# Patient Record
Sex: FEMALE | Race: WHITE | Employment: FULL TIME | URBAN - METROPOLITAN AREA
[De-identification: names, ages, dates, MRNs, and addresses within clinical notes are randomized per-mention and may not be internally consistent; named-entity substitution may affect disease eponyms.]

---

## 2021-03-29 ENCOUNTER — HOSPITAL ENCOUNTER (INPATIENT)
Age: 51
LOS: 4 days | Discharge: HOME | DRG: 897 | End: 2021-04-02
Attending: STUDENT IN AN ORGANIZED HEALTH CARE EDUCATION/TRAINING PROGRAM | Admitting: HOSPITALIST
Payer: COMMERCIAL

## 2021-03-29 ENCOUNTER — APPOINTMENT (OUTPATIENT)
Dept: CT IMAGING | Age: 51
DRG: 897 | End: 2021-03-29
Attending: STUDENT IN AN ORGANIZED HEALTH CARE EDUCATION/TRAINING PROGRAM
Payer: COMMERCIAL

## 2021-03-29 ENCOUNTER — APPOINTMENT (OUTPATIENT)
Dept: ULTRASOUND IMAGING | Age: 51
DRG: 897 | End: 2021-03-29
Attending: STUDENT IN AN ORGANIZED HEALTH CARE EDUCATION/TRAINING PROGRAM
Payer: COMMERCIAL

## 2021-03-29 DIAGNOSIS — R74.01 TRANSAMINITIS: Primary | ICD-10-CM

## 2021-03-29 DIAGNOSIS — R00.0 SINUS TACHYCARDIA: ICD-10-CM

## 2021-03-29 DIAGNOSIS — E87.20 LACTIC ACIDOSIS: ICD-10-CM

## 2021-03-29 DIAGNOSIS — R93.2 ABNORMAL ULTRASOUND OF LIVER: ICD-10-CM

## 2021-03-29 DIAGNOSIS — E87.6 HYPOKALEMIA: ICD-10-CM

## 2021-03-29 PROBLEM — E87.1 HYPONATREMIA: Status: ACTIVE | Noted: 2021-03-29

## 2021-03-29 PROBLEM — F10.10 ALCOHOL ABUSE: Status: ACTIVE | Noted: 2021-03-29

## 2021-03-29 PROBLEM — R53.1 GENERALIZED WEAKNESS: Status: ACTIVE | Noted: 2021-03-29

## 2021-03-29 PROBLEM — D69.6 THROMBOCYTOPENIA (HCC): Status: ACTIVE | Noted: 2021-03-29

## 2021-03-29 PROBLEM — K76.0 STEATOSIS OF LIVER: Status: ACTIVE | Noted: 2021-03-29

## 2021-03-29 LAB
ALBUMIN SERPL-MCNC: 3.5 G/DL (ref 3.5–5)
ALBUMIN/GLOB SERPL: 0.9 {RATIO} (ref 1.1–2.2)
ALP SERPL-CCNC: 124 U/L (ref 45–117)
ALT SERPL-CCNC: 186 U/L (ref 12–78)
ANION GAP SERPL CALC-SCNC: 11 MMOL/L (ref 5–15)
APPEARANCE UR: CLEAR
AST SERPL-CCNC: 476 U/L (ref 15–37)
BACTERIA URNS QL MICRO: NEGATIVE /HPF
BASOPHILS # BLD: 0 K/UL (ref 0–0.1)
BASOPHILS NFR BLD: 1 % (ref 0–1)
BILIRUB SERPL-MCNC: 0.5 MG/DL (ref 0.2–1)
BILIRUB UR QL: NEGATIVE
BNP SERPL-MCNC: 185 PG/ML
BUN SERPL-MCNC: 5 MG/DL (ref 6–20)
BUN/CREAT SERPL: 10 (ref 12–20)
CALCIUM SERPL-MCNC: 8.2 MG/DL (ref 8.5–10.1)
CHLORIDE SERPL-SCNC: 94 MMOL/L (ref 97–108)
CO2 SERPL-SCNC: 27 MMOL/L (ref 21–32)
COLOR UR: ABNORMAL
COMMENT, HOLDF: NORMAL
CREAT SERPL-MCNC: 0.5 MG/DL (ref 0.55–1.02)
DIFFERENTIAL METHOD BLD: ABNORMAL
EOSINOPHIL # BLD: 0 K/UL (ref 0–0.4)
EOSINOPHIL NFR BLD: 0 % (ref 0–7)
EPITH CASTS URNS QL MICRO: ABNORMAL /LPF
ERYTHROCYTE [DISTWIDTH] IN BLOOD BY AUTOMATED COUNT: 13.3 % (ref 11.5–14.5)
GLOBULIN SER CALC-MCNC: 3.7 G/DL (ref 2–4)
GLUCOSE BLD STRIP.AUTO-MCNC: 94 MG/DL (ref 65–100)
GLUCOSE SERPL-MCNC: 103 MG/DL (ref 65–100)
GLUCOSE UR STRIP.AUTO-MCNC: NEGATIVE MG/DL
HCT VFR BLD AUTO: 38.5 % (ref 35–47)
HETEROPH AB BLD QL IA: NEGATIVE
HGB BLD-MCNC: 14 G/DL (ref 11.5–16)
HGB UR QL STRIP: NEGATIVE
HYALINE CASTS URNS QL MICRO: ABNORMAL /LPF (ref 0–5)
IMM GRANULOCYTES # BLD AUTO: 0 K/UL (ref 0–0.04)
IMM GRANULOCYTES NFR BLD AUTO: 0 % (ref 0–0.5)
INR PPP: 1 (ref 0.9–1.1)
KETONES UR QL STRIP.AUTO: NEGATIVE MG/DL
LACTATE BLD-SCNC: 2.43 MMOL/L (ref 0.4–2)
LEUKOCYTE ESTERASE UR QL STRIP.AUTO: NEGATIVE
LIPASE SERPL-CCNC: 116 U/L (ref 73–393)
LYMPHOCYTES # BLD: 1.2 K/UL (ref 0.8–3.5)
LYMPHOCYTES NFR BLD: 36 % (ref 12–49)
MAGNESIUM SERPL-MCNC: 2.2 MG/DL (ref 1.6–2.4)
MCH RBC QN AUTO: 38.9 PG (ref 26–34)
MCHC RBC AUTO-ENTMCNC: 36.4 G/DL (ref 30–36.5)
MCV RBC AUTO: 106.9 FL (ref 80–99)
MONOCYTES # BLD: 0.3 K/UL (ref 0–1)
MONOCYTES NFR BLD: 9 % (ref 5–13)
NEUTS SEG # BLD: 1.8 K/UL (ref 1.8–8)
NEUTS SEG NFR BLD: 55 % (ref 32–75)
NITRITE UR QL STRIP.AUTO: NEGATIVE
NRBC # BLD: 0 K/UL (ref 0–0.01)
NRBC BLD-RTO: 0 PER 100 WBC
PH UR STRIP: 7 [PH] (ref 5–8)
PLATELET # BLD AUTO: 138 K/UL (ref 150–400)
PMV BLD AUTO: 10.1 FL (ref 8.9–12.9)
POTASSIUM SERPL-SCNC: 3 MMOL/L (ref 3.5–5.1)
PROT SERPL-MCNC: 7.2 G/DL (ref 6.4–8.2)
PROT UR STRIP-MCNC: NEGATIVE MG/DL
PROTHROMBIN TIME: 10.3 SEC (ref 9–11.1)
RBC # BLD AUTO: 3.6 M/UL (ref 3.8–5.2)
RBC #/AREA URNS HPF: ABNORMAL /HPF (ref 0–5)
SAMPLES BEING HELD,HOLD: NORMAL
SERVICE CMNT-IMP: NORMAL
SODIUM SERPL-SCNC: 132 MMOL/L (ref 136–145)
SP GR UR REFRACTOMETRY: <1.005 (ref 1–1.03)
TROPONIN I SERPL-MCNC: <0.05 NG/ML
UR CULT HOLD, URHOLD: NORMAL
UROBILINOGEN UR QL STRIP.AUTO: 0.2 EU/DL (ref 0.2–1)
WBC # BLD AUTO: 3.3 K/UL (ref 3.6–11)
WBC URNS QL MICRO: ABNORMAL /HPF (ref 0–4)

## 2021-03-29 PROCEDURE — 82962 GLUCOSE BLOOD TEST: CPT

## 2021-03-29 PROCEDURE — 82140 ASSAY OF AMMONIA: CPT

## 2021-03-29 PROCEDURE — 85025 COMPLETE CBC W/AUTO DIFF WBC: CPT

## 2021-03-29 PROCEDURE — 36415 COLL VENOUS BLD VENIPUNCTURE: CPT

## 2021-03-29 PROCEDURE — 83605 ASSAY OF LACTIC ACID: CPT

## 2021-03-29 PROCEDURE — 80074 ACUTE HEPATITIS PANEL: CPT

## 2021-03-29 PROCEDURE — 76705 ECHO EXAM OF ABDOMEN: CPT

## 2021-03-29 PROCEDURE — 74011250637 HC RX REV CODE- 250/637: Performed by: STUDENT IN AN ORGANIZED HEALTH CARE EDUCATION/TRAINING PROGRAM

## 2021-03-29 PROCEDURE — 85610 PROTHROMBIN TIME: CPT

## 2021-03-29 PROCEDURE — 96360 HYDRATION IV INFUSION INIT: CPT

## 2021-03-29 PROCEDURE — 87040 BLOOD CULTURE FOR BACTERIA: CPT

## 2021-03-29 PROCEDURE — 83880 ASSAY OF NATRIURETIC PEPTIDE: CPT

## 2021-03-29 PROCEDURE — 74011000636 HC RX REV CODE- 636: Performed by: RADIOLOGY

## 2021-03-29 PROCEDURE — 74011250636 HC RX REV CODE- 250/636: Performed by: STUDENT IN AN ORGANIZED HEALTH CARE EDUCATION/TRAINING PROGRAM

## 2021-03-29 PROCEDURE — 93005 ELECTROCARDIOGRAM TRACING: CPT

## 2021-03-29 PROCEDURE — 83690 ASSAY OF LIPASE: CPT

## 2021-03-29 PROCEDURE — 71275 CT ANGIOGRAPHY CHEST: CPT

## 2021-03-29 PROCEDURE — 84484 ASSAY OF TROPONIN QUANT: CPT

## 2021-03-29 PROCEDURE — 65270000029 HC RM PRIVATE

## 2021-03-29 PROCEDURE — 80053 COMPREHEN METABOLIC PANEL: CPT

## 2021-03-29 PROCEDURE — 99285 EMERGENCY DEPT VISIT HI MDM: CPT

## 2021-03-29 PROCEDURE — 83735 ASSAY OF MAGNESIUM: CPT

## 2021-03-29 PROCEDURE — 86803 HEPATITIS C AB TEST: CPT

## 2021-03-29 PROCEDURE — 86308 HETEROPHILE ANTIBODY SCREEN: CPT

## 2021-03-29 PROCEDURE — 81001 URINALYSIS AUTO W/SCOPE: CPT

## 2021-03-29 RX ORDER — PROCHLORPERAZINE EDISYLATE 5 MG/ML
10 INJECTION INTRAMUSCULAR; INTRAVENOUS
Status: DISCONTINUED | OUTPATIENT
Start: 2021-03-29 | End: 2021-04-02 | Stop reason: HOSPADM

## 2021-03-29 RX ORDER — LORAZEPAM 2 MG/ML
4 INJECTION INTRAMUSCULAR
Status: DISCONTINUED | OUTPATIENT
Start: 2021-03-29 | End: 2021-03-30

## 2021-03-29 RX ORDER — DEXTROSE, SODIUM CHLORIDE, AND POTASSIUM CHLORIDE 5; .9; .15 G/100ML; G/100ML; G/100ML
100 INJECTION INTRAVENOUS CONTINUOUS
Status: DISCONTINUED | OUTPATIENT
Start: 2021-03-29 | End: 2021-03-30

## 2021-03-29 RX ORDER — FOLIC ACID 1 MG/1
1 TABLET ORAL DAILY
Status: DISCONTINUED | OUTPATIENT
Start: 2021-03-30 | End: 2021-04-02 | Stop reason: HOSPADM

## 2021-03-29 RX ORDER — THERA TABS 400 MCG
1 TAB ORAL DAILY
Status: DISCONTINUED | OUTPATIENT
Start: 2021-03-30 | End: 2021-04-02 | Stop reason: HOSPADM

## 2021-03-29 RX ORDER — POTASSIUM CHLORIDE 750 MG/1
40 TABLET, FILM COATED, EXTENDED RELEASE ORAL ONCE
Status: COMPLETED | OUTPATIENT
Start: 2021-03-29 | End: 2021-03-30

## 2021-03-29 RX ORDER — LORAZEPAM 2 MG/ML
2 INJECTION INTRAMUSCULAR
Status: DISCONTINUED | OUTPATIENT
Start: 2021-03-29 | End: 2021-03-30

## 2021-03-29 RX ORDER — LISINOPRIL 5 MG/1
5 TABLET ORAL DAILY
COMMUNITY
End: 2021-04-02

## 2021-03-29 RX ORDER — ASPIRIN 325 MG/1
100 TABLET, FILM COATED ORAL DAILY
Status: DISCONTINUED | OUTPATIENT
Start: 2021-03-30 | End: 2021-04-02 | Stop reason: HOSPADM

## 2021-03-29 RX ORDER — POTASSIUM CHLORIDE 750 MG/1
20 TABLET, FILM COATED, EXTENDED RELEASE ORAL
Status: COMPLETED | OUTPATIENT
Start: 2021-03-29 | End: 2021-03-29

## 2021-03-29 RX ADMIN — POTASSIUM CHLORIDE 20 MEQ: 750 TABLET, EXTENDED RELEASE ORAL at 22:46

## 2021-03-29 RX ADMIN — IOPAMIDOL 54 ML: 755 INJECTION, SOLUTION INTRAVENOUS at 21:24

## 2021-03-29 RX ADMIN — SODIUM CHLORIDE 1000 ML: 9 INJECTION, SOLUTION INTRAVENOUS at 20:21

## 2021-03-29 NOTE — ED TRIAGE NOTES
Pt reports she has had increased fatigue and decreased appetite for the past few days so she went to [facility] today. Rapid covid was negative but had an elevated D dimer and elevated liver enzymes so she was referred to the ED for further evaluation. Denies chest pain, SOB, or abdominal pain. Denies any anticoagulants.

## 2021-03-30 PROBLEM — F10.939 ALCOHOL WITHDRAWAL (HCC): Status: ACTIVE | Noted: 2021-03-30

## 2021-03-30 PROBLEM — R63.4 UNINTENTIONAL WEIGHT LOSS: Status: ACTIVE | Noted: 2021-03-30

## 2021-03-30 PROBLEM — E46 PROTEIN CALORIE MALNUTRITION (HCC): Status: ACTIVE | Noted: 2021-03-30

## 2021-03-30 LAB
ALBUMIN SERPL-MCNC: 3 G/DL (ref 3.5–5)
ALBUMIN/GLOB SERPL: 1.1 {RATIO} (ref 1.1–2.2)
ALP SERPL-CCNC: 99 U/L (ref 45–117)
ALT SERPL-CCNC: 141 U/L (ref 12–78)
AMMONIA PLAS-SCNC: <10 UMOL/L
ANION GAP SERPL CALC-SCNC: 7 MMOL/L (ref 5–15)
AST SERPL-CCNC: 281 U/L (ref 15–37)
ATRIAL RATE: 112 BPM
BASOPHILS # BLD: 0 K/UL (ref 0–0.1)
BASOPHILS NFR BLD: 1 % (ref 0–1)
BILIRUB SERPL-MCNC: 0.6 MG/DL (ref 0.2–1)
BUN SERPL-MCNC: 3 MG/DL (ref 6–20)
BUN/CREAT SERPL: 11 (ref 12–20)
CALCIUM SERPL-MCNC: 7.5 MG/DL (ref 8.5–10.1)
CALCULATED P AXIS, ECG09: 74 DEGREES
CALCULATED R AXIS, ECG10: 66 DEGREES
CALCULATED T AXIS, ECG11: 60 DEGREES
CHLORIDE SERPL-SCNC: 104 MMOL/L (ref 97–108)
CO2 SERPL-SCNC: 26 MMOL/L (ref 21–32)
CREAT SERPL-MCNC: 0.27 MG/DL (ref 0.55–1.02)
DIAGNOSIS, 93000: NORMAL
DIFFERENTIAL METHOD BLD: ABNORMAL
EOSINOPHIL # BLD: 0 K/UL (ref 0–0.4)
EOSINOPHIL NFR BLD: 1 % (ref 0–7)
ERYTHROCYTE [DISTWIDTH] IN BLOOD BY AUTOMATED COUNT: 13.5 % (ref 11.5–14.5)
GLOBULIN SER CALC-MCNC: 2.7 G/DL (ref 2–4)
GLUCOSE SERPL-MCNC: 79 MG/DL (ref 65–100)
HCT VFR BLD AUTO: 31.9 % (ref 35–47)
HGB BLD-MCNC: 11.4 G/DL (ref 11.5–16)
IMM GRANULOCYTES # BLD AUTO: 0 K/UL (ref 0–0.04)
IMM GRANULOCYTES NFR BLD AUTO: 0 % (ref 0–0.5)
INR PPP: 1.1 (ref 0.9–1.1)
LACTATE SERPL-SCNC: 1.2 MMOL/L (ref 0.4–2)
LYMPHOCYTES # BLD: 1.3 K/UL (ref 0.8–3.5)
LYMPHOCYTES NFR BLD: 32 % (ref 12–49)
MAGNESIUM SERPL-MCNC: 2 MG/DL (ref 1.6–2.4)
MCH RBC QN AUTO: 38.6 PG (ref 26–34)
MCHC RBC AUTO-ENTMCNC: 35.7 G/DL (ref 30–36.5)
MCV RBC AUTO: 108.1 FL (ref 80–99)
MONOCYTES # BLD: 0.4 K/UL (ref 0–1)
MONOCYTES NFR BLD: 9 % (ref 5–13)
NEUTS SEG # BLD: 2.3 K/UL (ref 1.8–8)
NEUTS SEG NFR BLD: 57 % (ref 32–75)
NRBC # BLD: 0 K/UL (ref 0–0.01)
NRBC BLD-RTO: 0 PER 100 WBC
P-R INTERVAL, ECG05: 144 MS
PLATELET # BLD AUTO: 125 K/UL (ref 150–400)
PMV BLD AUTO: 10.2 FL (ref 8.9–12.9)
POTASSIUM SERPL-SCNC: 3.4 MMOL/L (ref 3.5–5.1)
PREALB SERPL-MCNC: 17.3 MG/DL (ref 20–40)
PROT SERPL-MCNC: 5.7 G/DL (ref 6.4–8.2)
PROTHROMBIN TIME: 11 SEC (ref 9–11.1)
Q-T INTERVAL, ECG07: 324 MS
QRS DURATION, ECG06: 72 MS
QTC CALCULATION (BEZET), ECG08: 442 MS
RBC # BLD AUTO: 2.95 M/UL (ref 3.8–5.2)
RBC MORPH BLD: ABNORMAL
SODIUM SERPL-SCNC: 137 MMOL/L (ref 136–145)
T4 FREE SERPL-MCNC: 0.9 NG/DL (ref 0.8–1.5)
TSH SERPL DL<=0.05 MIU/L-ACNC: 9 UIU/ML (ref 0.36–3.74)
VENTRICULAR RATE, ECG03: 112 BPM
WBC # BLD AUTO: 4 K/UL (ref 3.6–11)

## 2021-03-30 PROCEDURE — 84443 ASSAY THYROID STIM HORMONE: CPT

## 2021-03-30 PROCEDURE — 36415 COLL VENOUS BLD VENIPUNCTURE: CPT

## 2021-03-30 PROCEDURE — 65660000000 HC RM CCU STEPDOWN

## 2021-03-30 PROCEDURE — 83605 ASSAY OF LACTIC ACID: CPT

## 2021-03-30 PROCEDURE — 74011000250 HC RX REV CODE- 250: Performed by: HOSPITALIST

## 2021-03-30 PROCEDURE — 84439 ASSAY OF FREE THYROXINE: CPT

## 2021-03-30 PROCEDURE — 85610 PROTHROMBIN TIME: CPT

## 2021-03-30 PROCEDURE — 80053 COMPREHEN METABOLIC PANEL: CPT

## 2021-03-30 PROCEDURE — 84134 ASSAY OF PREALBUMIN: CPT

## 2021-03-30 PROCEDURE — 85025 COMPLETE CBC W/AUTO DIFF WBC: CPT

## 2021-03-30 PROCEDURE — 74011250637 HC RX REV CODE- 250/637: Performed by: INTERNAL MEDICINE

## 2021-03-30 PROCEDURE — 83735 ASSAY OF MAGNESIUM: CPT

## 2021-03-30 PROCEDURE — 74011250636 HC RX REV CODE- 250/636: Performed by: HOSPITALIST

## 2021-03-30 PROCEDURE — 74011250637 HC RX REV CODE- 250/637: Performed by: HOSPITALIST

## 2021-03-30 RX ORDER — POTASSIUM CHLORIDE 750 MG/1
40 TABLET, FILM COATED, EXTENDED RELEASE ORAL
Status: COMPLETED | OUTPATIENT
Start: 2021-03-30 | End: 2021-03-30

## 2021-03-30 RX ORDER — LORAZEPAM 2 MG/ML
2 INJECTION INTRAMUSCULAR
Status: DISPENSED | OUTPATIENT
Start: 2021-03-30 | End: 2021-03-30

## 2021-03-30 RX ORDER — DIAZEPAM 10 MG/2ML
10 INJECTION INTRAMUSCULAR
Status: DISCONTINUED | OUTPATIENT
Start: 2021-03-30 | End: 2021-03-30

## 2021-03-30 RX ORDER — DIAZEPAM 10 MG/2ML
20 INJECTION INTRAMUSCULAR
Status: DISCONTINUED | OUTPATIENT
Start: 2021-03-30 | End: 2021-03-30

## 2021-03-30 RX ORDER — LEVOTHYROXINE SODIUM 25 UG/1
25 TABLET ORAL
Status: DISCONTINUED | OUTPATIENT
Start: 2021-03-31 | End: 2021-04-02 | Stop reason: HOSPADM

## 2021-03-30 RX ORDER — LISINOPRIL 5 MG/1
5 TABLET ORAL DAILY
Status: DISCONTINUED | OUTPATIENT
Start: 2021-03-31 | End: 2021-04-01

## 2021-03-30 RX ORDER — LORAZEPAM 2 MG/ML
2 INJECTION INTRAMUSCULAR
Status: DISCONTINUED | OUTPATIENT
Start: 2021-03-30 | End: 2021-03-31

## 2021-03-30 RX ORDER — LORAZEPAM 2 MG/ML
4 INJECTION INTRAMUSCULAR
Status: DISCONTINUED | OUTPATIENT
Start: 2021-03-30 | End: 2021-03-31

## 2021-03-30 RX ADMIN — THIAMINE HCL TAB 100 MG 100 MG: 100 TAB at 09:55

## 2021-03-30 RX ADMIN — LORAZEPAM 2 MG: 2 INJECTION INTRAMUSCULAR; INTRAVENOUS at 04:51

## 2021-03-30 RX ADMIN — LORAZEPAM 2 MG: 2 INJECTION INTRAMUSCULAR; INTRAVENOUS at 01:40

## 2021-03-30 RX ADMIN — POTASSIUM CHLORIDE, DEXTROSE MONOHYDRATE AND SODIUM CHLORIDE 100 ML/HR: 150; 5; 900 INJECTION, SOLUTION INTRAVENOUS at 11:42

## 2021-03-30 RX ADMIN — THERA TABS 1 TABLET: TAB at 09:55

## 2021-03-30 RX ADMIN — FOLIC ACID 1 MG: 1 TABLET ORAL at 09:55

## 2021-03-30 RX ADMIN — THIAMINE HYDROCHLORIDE: 100 INJECTION, SOLUTION INTRAMUSCULAR; INTRAVENOUS at 00:17

## 2021-03-30 RX ADMIN — POTASSIUM CHLORIDE 40 MEQ: 750 TABLET, EXTENDED RELEASE ORAL at 13:49

## 2021-03-30 RX ADMIN — POTASSIUM CHLORIDE 40 MEQ: 750 TABLET, EXTENDED RELEASE ORAL at 00:45

## 2021-03-30 RX ADMIN — LORAZEPAM 2 MG: 2 INJECTION INTRAMUSCULAR; INTRAVENOUS at 01:09

## 2021-03-30 NOTE — ROUTINE PROCESS
Bedside shift change report given to [facility] [place] (oncoming nurse) by Vinnie Huerta (offgoing nurse). Report included the following information SBAR, Kardex, Intake/Output, MAR, Accordion and Recent Results. This patient was assisted with Intentional Toileting every 2 hours during this shift. Documentation of ambulation and output reflected on Flowsheet.

## 2021-03-30 NOTE — ED PROVIDER NOTES
Chief Complaint   Patient presents with    Abnormal Lab Results    Fatigue     This is a 69-year-old female with a history of hypertension referred here from urgent care for abnormal lab work including an elevated D-dimer in the setting of a primary complaint of fatigue for several days and an elevated heart rate, she was also found to have elevated liver enzymes. She drinks 3 glasses of wine daily in the evening, has never had any symptoms of alcohol withdrawal in the past.  Denies any fevers, headache, chest pain, shortness of breath, abdominal pain, vomiting. She has never been diagnosed with alcoholic cirrhosis previously. Has no history of recent travel or previous IV drug use, has no history of hepatitis. Her primary concern when she went to urgent care today was that she might have COVID-19, they did a rapid test there that was negative and referred her here for further evaluation. She has no other systemic complaints. Symptoms are moderate in nature without alleviating or exacerbating factors. Past Medical History:   Diagnosis Date    Hypertension        No past surgical history on file. CORRECTION:  No pertinent surgical history, obtained by me      No family history on file.     Social History     Socioeconomic History    Marital status:      Spouse name: Not on file    Number of children: Not on file    Years of education: Not on file    Highest education level: Not on file   Occupational History    Not on file   Social Needs    Financial resource strain: Not on file    Food insecurity     Worry: Not on file     Inability: Not on file    Transportation needs     Medical: Not on file     Non-medical: Not on file   Tobacco Use    Smoking status: Not on file   Substance and Sexual Activity    Alcohol use: Not on file    Drug use: Not on file    Sexual activity: Not on file   Lifestyle    Physical activity     Days per week: Not on file     Minutes per session: Not on file    Stress: Not on file   Relationships    Social connections     Talks on phone: Not on file     Gets together: Not on file     Attends Latter-day service: Not on file     Active member of club or organization: Not on file     Attends meetings of clubs or organizations: Not on file     Relationship status: Not on file    Intimate partner violence     Fear of current or ex partner: Not on file     Emotionally abused: Not on file     Physically abused: Not on file     Forced sexual activity: Not on file   Other Topics Concern    Not on file   Social History Narrative    Not on file         ALLERGIES: Patient has no known allergies. Review of Systems   Constitutional: Positive for fatigue. Negative for fever. HENT: Negative for facial swelling. Eyes: Negative for redness. Respiratory: Negative for shortness of breath. Cardiovascular: Negative for chest pain. Gastrointestinal: Negative for abdominal pain and vomiting. Genitourinary: Negative for difficulty urinating. Musculoskeletal: Negative for back pain and neck pain. Neurological: Negative for headaches. Psychiatric/Behavioral: Negative for confusion.        Vitals:    03/29/21 1938 03/29/21 1945 03/29/21 2007 03/29/21 2015   BP: (!) 124/90 133/85 128/88 124/82   Pulse:  (!) 110 (!) 107 (!) 103   Resp:  20 21 15   Temp:       SpO2: 97% 98% 100% 100%   Weight:       Height:                Physical Exam  General:  Awake and alert, NAD  HEENT:  NC/AT, equal pupils, moist mucous membranes  Neck:   Normal inspection, full range of motion  Cardiac:  +Tachycardic, regular rhythm, no murmurs  Respiratory:  Clear bilaterally, no wheezes, rales, rhonchi  Abdomen:  Soft and nontender, nondistended  Extremities: Warm and well perfused, no peripheral edema  Neuro:  Moving all extremities symmetrically without gross motor deficit  Skin:   +Erythematous rash across the anterior chest    RESULTS  Recent Results (from the past 12 hour(s))   EKG, 12 LEAD, INITIAL    Collection Time: 03/29/21  7:45 PM   Result Value Ref Range    Ventricular Rate 112 BPM    Atrial Rate 112 BPM    P-R Interval 144 ms    QRS Duration 72 ms    Q-T Interval 324 ms    QTC Calculation (Bezet) 442 ms    Calculated P Axis 74 degrees    Calculated R Axis 66 degrees    Calculated T Axis 60 degrees    Diagnosis       Sinus tachycardia  Biatrial enlargement  Abnormal ECG  No previous ECGs available     SAMPLES BEING HELD    Collection Time: 03/29/21  8:13 PM   Result Value Ref Range    SAMPLES BEING HELD RED, GOLD     COMMENT        Add-on orders for these samples will be processed based on acceptable specimen integrity and analyte stability, which may vary by analyte. CBC WITH AUTOMATED DIFF    Collection Time: 03/29/21  8:13 PM   Result Value Ref Range    WBC 3.3 (L) 3.6 - 11.0 K/uL    RBC 3.60 (L) 3.80 - 5.20 M/uL    HGB 14.0 11.5 - 16.0 g/dL    HCT 38.5 35.0 - 47.0 %    .9 (H) 80.0 - 99.0 FL    MCH 38.9 (H) 26.0 - 34.0 PG    MCHC 36.4 30.0 - 36.5 g/dL    RDW 13.3 11.5 - 14.5 %    PLATELET 350 (L) 675 - 400 K/uL    MPV 10.1 8.9 - 12.9 FL    NRBC 0.0 0  WBC    ABSOLUTE NRBC 0.00 0.00 - 0.01 K/uL    NEUTROPHILS 55 32 - 75 %    LYMPHOCYTES 36 12 - 49 %    MONOCYTES 9 5 - 13 %    EOSINOPHILS 0 0 - 7 %    BASOPHILS 1 0 - 1 %    IMMATURE GRANULOCYTES 0 0.0 - 0.5 %    ABS. NEUTROPHILS 1.8 1.8 - 8.0 K/UL    ABS. LYMPHOCYTES 1.2 0.8 - 3.5 K/UL    ABS. MONOCYTES 0.3 0.0 - 1.0 K/UL    ABS. EOSINOPHILS 0.0 0.0 - 0.4 K/UL    ABS. BASOPHILS 0.0 0.0 - 0.1 K/UL    ABS. IMM.  GRANS. 0.0 0.00 - 0.04 K/UL    DF AUTOMATED     METABOLIC PANEL, COMPREHENSIVE    Collection Time: 03/29/21  8:13 PM   Result Value Ref Range    Sodium 132 (L) 136 - 145 mmol/L    Potassium 3.0 (L) 3.5 - 5.1 mmol/L    Chloride 94 (L) 97 - 108 mmol/L    CO2 27 21 - 32 mmol/L    Anion gap 11 5 - 15 mmol/L    Glucose 103 (H) 65 - 100 mg/dL    BUN 5 (L) 6 - 20 MG/DL    Creatinine 0.50 (L) 0.55 - 1.02 MG/DL    BUN/Creatinine ratio 10 (L) 12 - 20      GFR est AA >60 >60 ml/min/1.73m2    GFR est non-AA >60 >60 ml/min/1.73m2    Calcium 8.2 (L) 8.5 - 10.1 MG/DL    Bilirubin, total 0.5 0.2 - 1.0 MG/DL    ALT (SGPT) 186 (H) 12 - 78 U/L    AST (SGOT) 476 (H) 15 - 37 U/L    Alk.  phosphatase 124 (H) 45 - 117 U/L    Protein, total 7.2 6.4 - 8.2 g/dL    Albumin 3.5 3.5 - 5.0 g/dL    Globulin 3.7 2.0 - 4.0 g/dL    A-G Ratio 0.9 (L) 1.1 - 2.2     MAGNESIUM    Collection Time: 03/29/21  8:13 PM   Result Value Ref Range    Magnesium 2.2 1.6 - 2.4 mg/dL   TROPONIN I    Collection Time: 03/29/21  8:13 PM   Result Value Ref Range    Troponin-I, Qt. <0.05 <0.05 ng/mL   PROTHROMBIN TIME + INR    Collection Time: 03/29/21  8:13 PM   Result Value Ref Range    INR 1.0 0.9 - 1.1      Prothrombin time 10.3 9.0 - 11.1 sec   NT-PRO BNP    Collection Time: 03/29/21  8:13 PM   Result Value Ref Range    NT pro- (H) <125 PG/ML   LIPASE    Collection Time: 03/29/21  8:13 PM   Result Value Ref Range    Lipase 116 73 - 393 U/L   MONONUCLEOSIS SCREEN    Collection Time: 03/29/21  8:13 PM   Result Value Ref Range    Mononucleosis screen Negative NEG     POC LACTIC ACID    Collection Time: 03/29/21  8:15 PM   Result Value Ref Range    Lactic Acid (POC) 2.43 (HH) 0.40 - 2.00 mmol/L   GLUCOSE, POC    Collection Time: 03/29/21  9:02 PM   Result Value Ref Range    Glucose (POC) 94 65 - 100 mg/dL    Performed by Jackie [place] W/MICROSCOPIC    Collection Time: 03/29/21  9:38 PM   Result Value Ref Range    Color YELLOW/STRAW      Appearance CLEAR CLEAR      Specific gravity <1.005 1.003 - 1.030    pH (UA) 7.0 5.0 - 8.0      Protein Negative NEG mg/dL    Glucose Negative NEG mg/dL    Ketone Negative NEG mg/dL    Bilirubin Negative NEG      Blood Negative NEG      Urobilinogen 0.2 0.2 - 1.0 EU/dL    Nitrites Negative NEG      Leukocyte Esterase Negative NEG      WBC 0-4 0 - 4 /hpf    RBC 0-5 0 - 5 /hpf    Epithelial cells MODERATE (A) FEW /lpf    Bacteria Negative NEG /hpf    Hyaline cast 0-2 0 - 5 /lpf   URINE CULTURE HOLD SAMPLE    Collection Time: 03/29/21  9:38 PM    Specimen: Serum; Urine   Result Value Ref Range    Urine culture hold        Urine on hold in Microbiology dept for 2 days. If unpreserved urine is submitted, it cannot be used for addtional testing after 24 hours, recollection will be required. IMAGING  Cta Chest W Or W Wo Cont    Result Date: 3/29/2021  No evidence of acute pulmonary embolus or other acute cardiopulmonary process. Diffuse hepatic steatosis. [facility]    Result Date: 3/29/2021  Mildly increased hepatic echogenicity suggests hepatic parenchymal disease, possibly hepatic steatosis. Mildly dilated common bile duct, with no visualized choledocholithiasis. Procedures - none unless documented below  EKG as interpreted by me:  Sinus tachycardia at a rate of 112, normal axis and intervals, grossly no ST or T wave changes suggesting acute ischemia. ED course: Labs, EKG and imaging reviewed. Referred here from urgent care for elevated liver enzymes and dimer, primary complaint was several days of fatigue. Endorses a history of chronic heavy alcohol use, CIWA score of 1 here, no history of withdrawal.  Abnormal US suggests hepatic parenchymal disease. Suspect alcoholic cirrhosis. Platelet count is slightly decreased which points to this as well. No signs of anasarca on exam.  Needs to be worked up for liver disease, evaluation by GI.  IV fluids given with improvement in HR here. I counseled the patient at the bedside regarding her alcohol use and the need to abstain going forward. Will admit for continued management, discussed with the hospitalist.      [address] for Admission  10:12 PM    ED Room Number:   ER19/19  Patient Name and age:  Lucy Velez 45 y.o.  female  Working Diagnosis:     1. Transaminitis    2. Hypokalemia    3. Lactic acidosis    4. Sinus tachycardia    5.  Abnormal ultrasound of liver      COVID suspicion:   no  Code Status:    Full Code  Readmission:    no  Isolation Requirements:  no  Recommended Level of Care: telemetry  Department:    [facility] ED - [phone]  Other:     Referred here from urgent care for elevated liver enzymes and dimer, primary complaint was several days of fatigue. Endorses a history of chronic heavy alcohol use, CIWA score of 1 here, no history of withdrawal.  Abnormal US suggests hepatic parenchymal disease. Suspect alcoholic cirrhosis. Platelet count is slightly decreased which points to this as well. Needs to be worked up for liver disease, evaluation by GI.  IV fluids given with improvement in HR here.

## 2021-03-30 NOTE — H&P
[facility]  [address] [facility], [facility] 19  [phone]     Hospitalist Admission Note      NAME: Enrique Medina   :  1970   MRN:  [id number]     Date/Time:  3/29/2021 11:21 PM    Patient PCP: Markel Wade MD    Emergency Contact:    Extended Emergency Contact Information  Primary Emergency Contact: Neo Santiago  Relation: Spouse      Code: Full Code     Isolation Precautions: There are currently no Active Isolations        Subjective:     CHIEF COMPLAINT: Abnormal labs    HISTORY OF PRESENT ILLNESS:     Ms. Dirk Elena is a 45 y.o. female with history that includes HTN and alcohol abuse reports feeling significant malaise, fatigue, weakness which has been constant for the past 4 to 5 days. She reports spending most of her time in bed since Friday. Associated symptoms have been nausea but denies fever and chills. Also reports weight loss of over 20 pounds in the past couple years. She decided to go to an urgent care for work-up. She was found to have elevated transaminases and sent to the ER. In the ER patient was found to have an elevated ALT at 186, elevated AST at 476 and a very slightly elevated alk phos at 124. In addition she had hyponatremia 132, hypokalemia at 3.0, thrombocytopenia 838, and a lactic acidosis at 2.43. She underwent abdominal imaging to include ultrasound and CT which showed steatosis of liver. The patient admits to drinking wine heavily since the lockdown was  for the coronavirus pandemic. Started drinking 1 cup of wine every afternoon and progressed to heavy drinking. She has never experienced alcohol withdrawal and reports having her last drink last night. During my visit patient was slightly anxious stating that she felt very tremulous and that it was progressively worsening. No Known Allergies    Prior to Admission medications    Medication Sig Start Date End Date Taking?  Authorizing Provider   lisinopriL (PRINIVIL, ZESTRIL) 5 mg tablet Take 5 mg by mouth daily. Yes Provider, Historical       Past Medical History:   Diagnosis Date    Hypertension         No past surgical history on file. Social History     Tobacco Use    Smoking status: Not on file   Substance Use Topics    Alcohol use: Not on file        FAMILY HISTORY:  no FH of premature CAD, hypertension, diabetes, CHF, stroke or hpyerlipidemia  FH of premature CAD:      PFMSH and medications were reviewed. Review of Systems (14 point ROS):  (bold if positive, if negative)    Gen:   , weight loss 20 pounds in two years, , , atigue, malaiseEyes:  , , ENT:   , , , , CVS:   , , , , , , , Pulm: , , , , GI:       , , , , , , , :     , , , , MS:     , , , Skin:   , , , , Psy:    , , anxiety, , , , Endo: , , , Hem:  , , , Rashard:   , , , , [place]:   , , , , , , , tremors        Objective:      Visit Vitals  BP (!) 148/78 (BP 1 Location: Right upper arm)   Pulse (!) 104   Temp 98.7 °F (37.1 °C)   Resp 16   Ht 5' 6\" (1.676 m)   Wt 54.4 kg (120 lb)   SpO2 99%   BMI 19.37 kg/m²       Exam:     Physical Exam:    General:  Alert, cooperative, NAD but did have slight tremors and appears slightly anxious. Head: Normocephalic, atraumatic  Eyes: PERRL and EOMI sclera clear  ENT: Lips, mucosa, and tongue normal.   Neck: supple, no tenderness  Lungs:  CTA with good BS and normal effort  Heart: S1-S2, tachycardia  Abd: SNTBS(+)  Ext: no cyanosis, no edema    Pulses: 2+ and symmetric  Skin: Skin color, texture, turgor normal. No rashes or lesions  Neuro:  alert, oriented x3, affect appropriate, no focal neurological deficits, moves all extremities well, fine tremors of hands and lower extremities. Psych: Slightly anxious but cooperative.       Labs:    Recent Labs     03/29/21 2013   WBC 3.3*   HGB 14.0   HCT 38.5   *     Recent Labs     03/29/21 2013   *   K 3.0*   CL 94*   CO2 27   *   BUN 5*   CREA 0.50*   CA 8.2*   MG 2.2   ALB 3.5   TBILI 0.5   * Lab Results   Component Value Date/Time    Glucose (POC) 94 03/29/2021 09:02 PM     No results for input(s): PH, PCO2, PO2, HCO3, FIO2 in the last 72 hours. Recent Labs     03/29/21 2013   INR 1.0       Radiology and EKG reviewed:       Cta Chest W Or W Wo Cont    Result Date: 3/29/2021  No evidence of acute pulmonary embolus or other acute cardiopulmonary process. Diffuse hepatic steatosis. [address]    Result Date: 3/29/2021  Mildly increased hepatic echogenicity suggests hepatic parenchymal disease, possibly hepatic steatosis. Mildly dilated common bile duct, with no visualized choledocholithiasis. I personally reviewed and interpreted the imaging studies and agree with official reading. **Chart reviewed in [facility]**       Assessment/Plan:      Alcohol withdrawal ([facility] [address] (3/30/2021) showing early symptoms POA / Alcohol abuse (3/29/2021) / Sinus tachycardia (3/29/2021) / Lactic acidosis (3/29/2021): EtOH withdrawal protocol: CIWA, Ativan load and ativan IV prn based on CIWA score, IVFs, banana bag & seizure precautions. Strongly advised to quit alcohol. Elevated liver transaminase level (3/29/2021) / Steatosis of liver (3/29/2021): Based on pattern appears to be alcohol induced with high AST compared to ALT. Possibly early cirrhosis. Will be checking hepatitis panel. Consider GI consult. Hypokalemia (3/29/2021) / Hyponatremia (3/29/2021): Replace and monitor levels. Will be getting IV fluids with normal saline and potassium. Has received oral KCl. Generalized weakness (3/29/2021): Appears to be multifactorial to include all of the diagnosis above. Treat as above. If not improving consider PT OT consult. Thrombocytopenia ([facility] [address] (3/29/2021): Likely alcohol induced. May also be related to possible cirrhosis. Unintentional weight loss and possible protein calorie malnutrition due to poor oral intake: Check TSH, free T4, prealbumin, and consult nutrition.      Body mass index is 19.37 kg/m².: 18.5 - 24.9 Normal weight        Risk of deterioration: high      Total time spent with patient: 79 Minutes **I personally saw and examined the patient during this time period**    Discussed:  Code Status and Care Plan discussed with:  [x]Patient  []Family  []Care Giver  [x]ED Doc  [x]RN  []Specialist  []Care Manager     Prophylaxis:  SCD's    Probable disposition:  Home w/Family    Date of service:    3/29/2021                ___________________________________________________    Admitting Physician: Destinee Gimenez MD

## 2021-03-30 NOTE — PROGRESS NOTES
Bedside shift change report given to [facility] (oncoming nurse) by Jose Spencer (offgoing nurse). Report included the following information SBAR, Kardex, ED Summary, Procedure Summary, Intake/Output, MAR, Recent Results and Cardiac Rhythm NSR.

## 2021-03-30 NOTE — ED NOTES
TRANSFER - OUT REPORT:    Verbal report given to Ivette Zelaya RN on [facility]  being transferred to [id number] for routine progression of care       Report consisted of patients Situation, Background, Assessment and   Recommendations(SBAR). Information from the following report(s) SBAR, ED Summary, [facility] and Recent Results was reviewed with the receiving nurse. Opportunity for questions and clarification was provided.

## 2021-03-30 NOTE — PROGRESS NOTES
Comprehensive Nutrition Assessment    Type and Reason for Visit: Initial, Consult    Nutrition Recommendations/Plan:   1. Add diet order. Regular diet recommended if medically appropriate. 2. Will add chocolate Ensure HP 1x/day once diet advanced. 3. Please obtain new measured weight. Nutrition Assessment:      3/30: 44 y/o female admitted with elevated liver transaminase. PMH includes HTN, alcohol abuse. RD consult received for \"unintentional weight loss. \" Pt confirms 20# wt loss x2 years. She attributes this weight loss to decreased appetite. Says she still tries to eat 3 meals/day + snacks. She does not follow a particular diet or avoid any certain foods. No c/o n/v/abd pain. No diet order currently in, will monitor for diet once available. Pt agreeable to trying Ensure HP once diet advanced to help maintain weight. She has no nutrition-related questions at this time. Labs- K 3.4. Meds- folic acid, MVI, thiamin. Malnutrition Assessment:  Malnutrition Status: none identified    Estimated Daily Nutrient Needs:  Energy (kcal): 4254(7581 x 1.3 AF); Weight Used for Energy Requirements: Current  Protein (g): 54(1-1.2 g/kg); Weight Used for Protein Requirements: Current  Fluid (ml/day): 1528; Method Used for Fluid Requirements: 1 ml/kcal      Nutrition Related Findings:  last BM 3/27      Wounds:    None       Current Nutrition Therapies:  No diet orders on file    Anthropometric Measures:  · Height:  5' 6\" (167.6 cm)  · Current Body Wt:  54.4 kg (120 lb)   · Admission Body Wt:       · Usual Body Wt:        · Ideal Body Wt:  130 lbs:  92.3 %   · Adjusted Body Weight:   ; Weight Adjustment for: No adjustment   · Adjusted BMI:       · BMI Category:  Normal weight (BMI 18.5-24. 9)       Nutrition Diagnosis:   · Unintended weight loss related to inadequate protein-energy intake as evidenced by weight loss(20# x2 years)      Nutrition Interventions:   Food and/or Nutrient Delivery: Start oral nutrition supplement, Start oral diet  Nutrition Education and Counseling: Education needed  Coordination of Nutrition Care: Continue to monitor while inpatient    Goals:  Diet advancement with po intake >50% meals + ONS and weight maintenance next 1-3 days       Nutrition Monitoring and Evaluation:   Behavioral-Environmental Outcomes: None identified  Food/Nutrient Intake Outcomes: Food and nutrient intake, Supplement intake, Diet advancement/tolerance  Physical Signs/Symptoms Outcomes: Weight, Biochemical data    Discharge Planning:     Too soon to determine     Electronically signed by Foster Chacon RDN on 3/30/2021 at 11:04 AM    Contact: [phone]

## 2021-03-30 NOTE — PROGRESS NOTES
[facility]: MED RECONCILIATION    Comments/Recommendations:     Patient able to confirm name, , allergies and preferred pharmacy    Medications added:     lisinopril      Information obtained from: Patient    Allergies: Patient has no known allergies. Prior to Admission Medications:     Prior to Admission Medications   Prescriptions Last Dose Informant Patient Reported? Taking?   lisinopriL (PRINIVIL, ZESTRIL) 5 mg tablet 3/29/2021 at Unknown time  Yes Yes   Sig: Take 5 mg by mouth daily. Facility-Administered Medications: None         [employer] MARYA.    Clinical Staff Pharmacist  [address] [place] [address]  [phone]

## 2021-03-30 NOTE — PROGRESS NOTES
Bedside shift change report given to [facility] (oncoming nurse) by Mary Villalpando (offgoing nurse). Report included the following information SBAR, Kardex, Procedure Summary, Intake/Output, MAR, Recent Results and Cardiac Rhythm NSR.     1815 Dr. Evelyn Almaraz (GI) called regarding consult order, stated he would visit pt in the morning. Informed Dr. Sony Hendricks that he would come in the morning. This patient was assisted with Intentional Toileting every 2 hours during this shift. Documentation of ambulation and output reflected on Flowsheet. 1930 Bedside shift change report given to [facility] (oncoming nurse) by Catherne Mortimer (offgoing nurse). Report included the following information SBAR, Kardex, ED Summary, Procedure Summary, Intake/Output, MAR, Recent Results and Cardiac Rhythm NSR.

## 2021-03-30 NOTE — PROGRESS NOTES
Jimmie Kelly [facility] 79  [address] [facility], [address] [place] [address]  [phone]      Medical Progress Note      NAME: Pravin House   :  1970  MRM:  [id number]    Date/Time of service: 3/30/2021  5:43 PM       Subjective:     Chief Complaint:  Patient was personally seen and examined by me during this time period. Chart reviewed. Reports weakness, no abd pain; all other systems reviewed are negative. Objective:       Vitals:       Last 24hrs VS reviewed since prior progress note.  Most recent are:    Visit Vitals  /78 (BP 1 Location: Right arm, BP Patient Position: At rest)   Pulse 84   Temp 99.1 °F (37.3 °C)   Resp 15   Ht 5' 6\" (1.676 m)   Wt 54.4 kg (120 lb)   SpO2 97%   BMI 19.37 kg/m²     SpO2 Readings from Last 6 Encounters:   21 97%            Intake/Output Summary (Last 24 hours) at 3/30/2021 1743  Last data filed at 3/30/2021 1602  Gross per 24 hour   Intake 2479.58 ml   Output 550 ml   Net 1929.58 ml        Exam:     Physical Exam:    Gen:  Well-developed, well-nourished, in no acute distress  HEENT:  Pink conjunctivae, PERRL, hearing intact to voice, moist mucous membranes  Resp:  No accessory muscle use, clear breath sounds without wheezes rales or rhonchi  Card:  RRR, No murmurs, normal S1, S2, no peripheral edema  Abd:  Soft, non-tender, non-distended, normoactive bowel sounds are present, no palpable organomegaly   Lymph:  No cervical adenopathy  Musc:  No cyanosis or clubbing  Skin:  No rashes or ulcers, skin turgor is good  Neuro:  Cranial nerves 3-12 are grossly intact, moves all extremitites, follows commands appropriately  Psych:  Oriented to person, place, and time, Alert with good insight      Medications Reviewed: (see below)    Lab Data Reviewed: (see below)    ______________________________________________________________________    Medications:     Current Facility-Administered Medications   Medication Dose Route Frequency    LORazepam (ATIVAN) injection 2 mg  2 mg IntraVENous Q1H PRN    LORazepam (ATIVAN) injection 4 mg  4 mg IntraVENous Q1H PRN    [START ON 3/31/2021] lisinopriL (PRINIVIL, ZESTRIL) tablet 5 mg  5 mg Oral DAILY    [START ON 3/31/2021] levothyroxine (SYNTHROID) tablet 25 mcg  25 mcg Oral ACB    prochlorperazine (COMPAZINE) injection 10 mg  10 mg IntraMUSCular Q6H PRN    therapeutic multivitamin (THERAGRAN) tablet 1 Tab  1 Tab Oral DAILY    folic acid (FOLVITE) tablet 1 mg  1 mg Oral DAILY    thiamine mononitrate (B-1) tablet 100 mg  100 mg Oral DAILY          Lab Review:     Recent Labs     03/30/21  0329 03/29/21 2013   WBC 4.0 3.3*   HGB 11.4* 14.0   HCT 31.9* 38.5   * 138*     Recent Labs     03/30/21  0329 03/29/21 2013    132*   K 3.4* 3.0*    94*   CO2 26 27   GLU 79 103*   BUN 3* 5*   CREA 0.27* 0.50*   CA 7.5* 8.2*   MG 2.0 2.2   ALB 3.0* 3.5   TBILI 0.6 0.5   * 186*   INR 1.1 1.0     Lab Results   Component Value Date/Time    Glucose (POC) 94 03/29/2021 09:02 PM          Assessment / Plan:     Alcohol withdrawal/ tachycardia: CIWA with IV Ativan Prn. S/p goody bag. Folate and thiamine. Seizure precations. Acute hepatitis: Likely due to alcohol. Hepatitis panel unremarkable. Ultrasound showing common bile dilation. Consult GI. Hypothyroidism: New diagnosis. Start low-dose levothyroxine. Outpatient follow-up. Hypokalemia/hyponatremia: improved. Likely due to alcohol abuse. Weakness: Treat hypothyroidism. Correct electrolyte deficiencies. Consult PT.     Thrombocytopenia ([address] (3/29/2021):  Likely due to alcohol abuse. Monitor.     Macrocytic anemia: Check B12 and folate. Monitor and transfuse as needed for hemoglobin under 7. Unintentional weight loss and possible protein calorie malnutrition due to poor oral intake:  Possibly due to alcohol abuse. Nutrition following. GI consult.      Total time spent with patient: 40 minutes **I personally saw and examined the patient during this time period**                 Care Plan discussed with: Patient and Family    Discussed:  Care Plan    Prophylaxis:  SCD's    Disposition:  Home w/Family           ___________________________________________________    Attending Physician: Kaleb Eid DO

## 2021-03-31 LAB
ALBUMIN SERPL-MCNC: 3.1 G/DL (ref 3.5–5)
ALBUMIN/GLOB SERPL: 1 {RATIO} (ref 1.1–2.2)
ALP SERPL-CCNC: 104 U/L (ref 45–117)
ALT SERPL-CCNC: 121 U/L (ref 12–78)
ANION GAP SERPL CALC-SCNC: 4 MMOL/L (ref 5–15)
AST SERPL-CCNC: 161 U/L (ref 15–37)
BASOPHILS # BLD: 0 K/UL (ref 0–0.1)
BASOPHILS NFR BLD: 1 % (ref 0–1)
BILIRUB SERPL-MCNC: 0.7 MG/DL (ref 0.2–1)
BUN SERPL-MCNC: 4 MG/DL (ref 6–20)
BUN/CREAT SERPL: 13 (ref 12–20)
CALCIUM SERPL-MCNC: 9 MG/DL (ref 8.5–10.1)
CHLORIDE SERPL-SCNC: 103 MMOL/L (ref 97–108)
CO2 SERPL-SCNC: 28 MMOL/L (ref 21–32)
COMMENT, 144067: NORMAL
COMMENT, HOLDF: NORMAL
CREAT SERPL-MCNC: 0.31 MG/DL (ref 0.55–1.02)
DIFFERENTIAL METHOD BLD: ABNORMAL
EOSINOPHIL # BLD: 0 K/UL (ref 0–0.4)
EOSINOPHIL NFR BLD: 1 % (ref 0–7)
ERYTHROCYTE [DISTWIDTH] IN BLOOD BY AUTOMATED COUNT: 13.4 % (ref 11.5–14.5)
FOLATE SERPL-MCNC: 5.9 NG/ML (ref 5–21)
GLOBULIN SER CALC-MCNC: 3 G/DL (ref 2–4)
GLUCOSE SERPL-MCNC: 83 MG/DL (ref 65–100)
HBV CORE AB SERPL QL IA: NEGATIVE
HBV CORE IGM SERPL QL IA: NEGATIVE
HBV E AB SERPL QL IA: NEGATIVE
HBV E AG SERPL QL IA: NEGATIVE
HBV SURFACE AB SER QL: NON REACTIVE INDEX VALUE
HBV SURFACE AG SERPL QL IA: NEGATIVE
HCT VFR BLD AUTO: 35 % (ref 35–47)
HCV AB S/CO SERPL IA: <0.1 S/CO RATIO (ref 0–0.9)
HGB BLD-MCNC: 12.1 G/DL (ref 11.5–16)
IMM GRANULOCYTES # BLD AUTO: 0 K/UL (ref 0–0.04)
IMM GRANULOCYTES NFR BLD AUTO: 1 % (ref 0–0.5)
LYMPHOCYTES # BLD: 1.7 K/UL (ref 0.8–3.5)
LYMPHOCYTES NFR BLD: 43 % (ref 12–49)
MCH RBC QN AUTO: 38.7 PG (ref 26–34)
MCHC RBC AUTO-ENTMCNC: 34.6 G/DL (ref 30–36.5)
MCV RBC AUTO: 111.8 FL (ref 80–99)
MONOCYTES # BLD: 0.3 K/UL (ref 0–1)
MONOCYTES NFR BLD: 8 % (ref 5–13)
NEUTS SEG # BLD: 1.9 K/UL (ref 1.8–8)
NEUTS SEG NFR BLD: 48 % (ref 32–75)
NRBC # BLD: 0 K/UL (ref 0–0.01)
NRBC BLD-RTO: 0 PER 100 WBC
PLATELET # BLD AUTO: 128 K/UL (ref 150–400)
PMV BLD AUTO: 10.8 FL (ref 8.9–12.9)
POTASSIUM SERPL-SCNC: 3.6 MMOL/L (ref 3.5–5.1)
PROT SERPL-MCNC: 6.1 G/DL (ref 6.4–8.2)
RBC # BLD AUTO: 3.13 M/UL (ref 3.8–5.2)
SAMPLES BEING HELD,HOLD: NORMAL
SODIUM SERPL-SCNC: 135 MMOL/L (ref 136–145)
VIT B12 SERPL-MCNC: 980 PG/ML (ref 193–986)
WBC # BLD AUTO: 3.9 K/UL (ref 3.6–11)

## 2021-03-31 PROCEDURE — 74011250637 HC RX REV CODE- 250/637: Performed by: INTERNAL MEDICINE

## 2021-03-31 PROCEDURE — 97161 PT EVAL LOW COMPLEX 20 MIN: CPT

## 2021-03-31 PROCEDURE — 85025 COMPLETE CBC W/AUTO DIFF WBC: CPT

## 2021-03-31 PROCEDURE — 74011250637 HC RX REV CODE- 250/637: Performed by: HOSPITALIST

## 2021-03-31 PROCEDURE — 65660000000 HC RM CCU STEPDOWN

## 2021-03-31 PROCEDURE — 97530 THERAPEUTIC ACTIVITIES: CPT

## 2021-03-31 PROCEDURE — 82746 ASSAY OF FOLIC ACID SERUM: CPT

## 2021-03-31 PROCEDURE — 36415 COLL VENOUS BLD VENIPUNCTURE: CPT

## 2021-03-31 PROCEDURE — 82607 VITAMIN B-12: CPT

## 2021-03-31 PROCEDURE — 80053 COMPREHEN METABOLIC PANEL: CPT

## 2021-03-31 PROCEDURE — 97116 GAIT TRAINING THERAPY: CPT

## 2021-03-31 RX ORDER — LORAZEPAM 2 MG/ML
1 INJECTION INTRAMUSCULAR
Status: DISCONTINUED | OUTPATIENT
Start: 2021-03-31 | End: 2021-04-02 | Stop reason: HOSPADM

## 2021-03-31 RX ORDER — LORAZEPAM 2 MG/ML
2 INJECTION INTRAMUSCULAR
Status: DISCONTINUED | OUTPATIENT
Start: 2021-03-31 | End: 2021-04-02 | Stop reason: HOSPADM

## 2021-03-31 RX ORDER — LORAZEPAM 1 MG/1
1 TABLET ORAL 2 TIMES DAILY
Status: DISCONTINUED | OUTPATIENT
Start: 2021-03-31 | End: 2021-04-01

## 2021-03-31 RX ORDER — CHLORDIAZEPOXIDE HYDROCHLORIDE 25 MG/1
25 CAPSULE, GELATIN COATED ORAL
Status: CANCELLED | OUTPATIENT
Start: 2021-03-31

## 2021-03-31 RX ADMIN — FOLIC ACID 1 MG: 1 TABLET ORAL at 09:14

## 2021-03-31 RX ADMIN — THIAMINE HCL TAB 100 MG 100 MG: 100 TAB at 09:14

## 2021-03-31 RX ADMIN — THERA TABS 1 TABLET: TAB at 09:14

## 2021-03-31 RX ADMIN — LEVOTHYROXINE SODIUM 25 MCG: 0.03 TABLET ORAL at 06:23

## 2021-03-31 RX ADMIN — LORAZEPAM 1 MG: 1 TABLET ORAL at 20:58

## 2021-03-31 RX ADMIN — LISINOPRIL 5 MG: 5 TABLET ORAL at 09:15

## 2021-03-31 NOTE — PROGRESS NOTES
Bedside and Verbal shift change report given to Laurel Brandt (oncoming nurse) by Lizabeth Riley (offgoing nurse). Report included the following information SBAR, Kardex and Cardiac Rhythm NSR-ST. Bedside and Verbal shift change report given to Iris Tidwell (oncoming nurse) by Laurel Brandt (offgoing nurse). Report included the following information SBAR, Kardex and Cardiac Rhythm NSR. This patient was assisted with Intentional Toileting every 2 hours during this shift. Documentation of ambulation and output reflected on Flowsheet.

## 2021-03-31 NOTE — ROUTINE PROCESS
Bedside shift change report given to Radha Andino (oncoming nurse) by Savage Egan (offgoing nurse). Report included the following information SBAR, Kardex, ED Summary, Intake/Output, MAR, Accordion, Recent Results, Med Rec Status and Cardiac Rhythm NSR, sinus tach.

## 2021-03-31 NOTE — PROGRESS NOTES
Shift Change:    Bedside and Verbal shift change report given to Matt RN (oncoming nurse) by Katherin Friend RN (offgoing nurse). Report included the following information SBAR, Kardex, Intake/Output, MAR, Recent Results and Cardiac Rhythm NSR.       1615: Notified Dr. Aliza Garcia pt 's with exertion/ambulation. Now at rest 105-110. Appears sinus on monitor. 1630: Will keep patient overnight due to HR per Dr. Aliza Garcia. Updated patient and family member. Shift Change:    Bedside and Verbal shift change report given to Ayan Hernández RN (oncoming nurse) by Sandoval Gavin RN (offgoing nurse). Report included the following information SBAR, Kardex, Intake/Output, MAR, Recent Results and Cardiac Rhythm NSR, Sinus Tach.

## 2021-03-31 NOTE — PROGRESS NOTES
Jimmie Kelly [facility] 79  [address] [facility], [address] [place] [address]  [phone]      Medical Progress Note      NAME: Cinda Moreno   :  1970  MRM:  [id number]    Date/Time of service: 3/31/2021  5:43 PM       Subjective:     Chief Complaint:  Patient was personally seen and examined by me during this time period. Chart reviewed. Received IV Ativan over night. Reports weakness, tremors; all other systems reviewed are negative. Objective:       Vitals:       Last 24hrs VS reviewed since prior progress note.  Most recent are:    Visit Vitals  /69 (BP 1 Location: Right arm, BP Patient Position: At rest)   Pulse (!) 105   Temp 99.3 °F (37.4 °C)   Resp 18   Ht 5' 6\" (1.676 m)   Wt 53.4 kg (117 lb 11.2 oz)   SpO2 96%   BMI 19.00 kg/m²     SpO2 Readings from Last 6 Encounters:   21 96%            Intake/Output Summary (Last 24 hours) at 3/31/2021 1706  Last data filed at 3/31/2021 1659  Gross per 24 hour   Intake 796.67 ml   Output 1250 ml   Net -453.33 ml        Exam:     Physical Exam:    Gen:  Well-developed, well-nourished, in no acute distress  HEENT:  Pink conjunctivae, PERRL, hearing intact to voice, moist mucous membranes  Resp:  No accessory muscle use, clear breath sounds without wheezes rales or rhonchi  Card:  RRR, No murmurs, normal S1, S2, no peripheral edema  Abd:  Soft, non-tender, non-distended, normoactive bowel sounds are present, no palpable organomegaly   Lymph:  No cervical adenopathy  Musc:  No cyanosis or clubbing  Skin:  No rashes or ulcers, skin turgor is good  Neuro:  Cranial nerves 3-12 are grossly intact, moves all extremitites, follows commands appropriately  Psych:  Oriented to person, place, and time, Alert with good insight      Medications Reviewed: (see below)    Lab Data Reviewed: (see below)    ______________________________________________________________________    Medications:     Current Facility-Administered Medications   Medication Dose Route Frequency    LORazepam (ATIVAN) injection 1 mg  1 mg IntraVENous Q1H PRN    LORazepam (ATIVAN) tablet 1 mg  1 mg Oral BID    LORazepam (ATIVAN) injection 2 mg  2 mg IntraVENous Q1H PRN    lisinopriL (PRINIVIL, ZESTRIL) tablet 5 mg  5 mg Oral DAILY    levothyroxine (SYNTHROID) tablet 25 mcg  25 mcg Oral ACB    prochlorperazine (COMPAZINE) injection 10 mg  10 mg IntraMUSCular Q6H PRN    therapeutic multivitamin (THERAGRAN) tablet 1 Tab  1 Tab Oral DAILY    folic acid (FOLVITE) tablet 1 mg  1 mg Oral DAILY    thiamine mononitrate (B-1) tablet 100 mg  100 mg Oral DAILY          Lab Review:     Recent Labs     03/31/21 0320 03/30/21 0329 03/29/21 2013   WBC 3.9 4.0 3.3*   HGB 12.1 11.4* 14.0   HCT 35.0 31.9* 38.5   * 125* 138*     Recent Labs     03/31/21 0320 03/30/21 0329 03/29/21 2013   * 137 132*   K 3.6 3.4* 3.0*    104 94*   CO2 28 26 27   GLU 83 79 103*   BUN 4* 3* 5*   CREA 0.31* 0.27* 0.50*   CA 9.0 7.5* 8.2*   MG  --  2.0 2.2   ALB 3.1* 3.0* 3.5   TBILI 0.7 0.6 0.5   * 141* 186*   INR  --  1.1 1.0     Lab Results   Component Value Date/Time    Glucose (POC) 94 03/29/2021 09:02 PM          Assessment / Plan:     Alcohol withdrawal/ tachycardia: Tachycardic into 140s with ambulation. Start low dose scheduled PO Ativan. CIWA with IV Ativan Prn. S/p goody bag. Continue folate and thiamine. Seizure precations. Acute hepatitis: Likely due to alcohol. Hepatitis panel unremarkable. More serologies pending per GI. Ultrasound showing common bile dilation; GI evaluated no acute concerns. F/u GI OP. Hypothyroidism: New diagnosis. low-dose levothyroxine started. Outpatient follow-up. Hypokalemia/hyponatremia: improved. Likely due to alcohol abuse. Weakness: Treat hypothyroidism. Correct electrolyte deficiencies. Consult PT. Set HH.      Thrombocytopenia ([address] (3/29/2021):  Likely due to alcohol abuse. Monitor.     Macrocytic anemia: B12 and folate wnl. Monitor and transfuse as needed for hemoglobin under 7. Unintentional weight loss and possible protein calorie malnutrition due to poor oral intake:  Possibly due to alcohol abuse. Nutrition following. GI evaluted; f/u OP.      Total time spent with patient: 35 minutes **I personally saw and examined the patient during this time period**                 Care Plan discussed with: Patient and Family    Discussed:  Care Plan    Prophylaxis:  SCD's    Disposition:  Home w/Family           ___________________________________________________    Attending Physician: Cherrie Spencer DO

## 2021-03-31 NOTE — PROGRESS NOTES
Problem: Falls - Risk of  Goal: *Absence of Falls  Description: Document Tom Mercado Fall Risk and appropriate interventions in the flowsheet.   Outcome: Progressing Towards Goal  Note: Fall Risk Interventions:  Mobility Interventions: Bed/chair exit alarm, Communicate number of staff needed for ambulation/transfer, OT consult for ADLs, Patient to call before getting OOB, PT Consult for mobility concerns, PT Consult for assist device competence, Utilize walker, cane, or other assistive device, Utilize gait belt for transfers/ambulation         Medication Interventions: Bed/chair exit alarm, Evaluate medications/consider consulting pharmacy, Patient to call before getting OOB, Teach patient to arise slowly, Utilize gait belt for transfers/ambulation    Elimination Interventions: Bed/chair exit alarm, Call light in reach, Patient to call for help with toileting needs, Stay With Me (per policy), Toileting schedule/hourly rounds

## 2021-03-31 NOTE — CONSULTS
Onur Saul. Missy Hendrix MD  [phone] office  [phone] [facility]   Gastroenterology Consultation Note      Admit Date: 3/29/2021  Consult Date: 3/31/2021   I greatly appreciate your asking me to see Lucy Velez, thank you very much for the opportunity to participate in her care. Narrative Assessment and Plan   · Elevated liver enzymes  · Alcohol use disorder  · Ultrasound with fatty liver, cholelithiasis, CBD 7mm. The steatosis is expected with ethanol intake, the gallstones are asymptomatic and don't require action, and her reported CBD size is insignificant. No further testing or therapy required for these findings. DF - 5, not significant. She reports '3-4' glasses of wine but indicates she may empty a 750mL bottle daily. One 750mL bottle of 12% wine equates to about 10 units of ethanol, well above what is considered a safe limit. I have unequivocally advised zero ethanol intake going forward. Her chief complaint at this point is weakness and inability to walk which I cannot explain from a GI/hepatology perspective other than related to ethanol withdrawal.    I have advised screening serologic testing to exclude alternate forms of liver disease. Once these have been obtained, I have no plans for additional testing and would not object to discharge so long as her other complaints do not necessitate ongoing hospitalization. I will follow up the results of serologic testing and arrange office visit for repeat liver enzymes. My office will contact her to arrange the follow up appointment in about 8 weeks. GI is signing off. Subjective:     Chief Complaint: Weakness    History of Present Illness: Weakness, located in the body, described as difficulty walking, present for days, associated with abnormal blood testing. PCP:  Brad Jimenez MD    Past Medical History:   Diagnosis Date    Hypertension         No past surgical history on file.     Social History     Tobacco Use    Smoking status: Not on file   Substance Use Topics    Alcohol use: Not on file        No family history on file. No Known Allergies         Home Medications:  Prior to Admission Medications   Prescriptions Last Dose Informant Patient Reported? Taking?   lisinopriL (PRINIVIL, ZESTRIL) 5 mg tablet 3/29/2021 at Unknown time  Yes Yes   Sig: Take 5 mg by mouth daily.       Facility-Administered Medications: None       Hospital Medications:  Current Facility-Administered Medications   Medication Dose Route Frequency    LORazepam (ATIVAN) injection 2 mg  2 mg IntraVENous Q1H PRN    LORazepam (ATIVAN) injection 4 mg  4 mg IntraVENous Q1H PRN    lisinopriL (PRINIVIL, ZESTRIL) tablet 5 mg  5 mg Oral DAILY    levothyroxine (SYNTHROID) tablet 25 mcg  25 mcg Oral ACB    prochlorperazine (COMPAZINE) injection 10 mg  10 mg IntraMUSCular Q6H PRN    therapeutic multivitamin (THERAGRAN) tablet 1 Tab  1 Tab Oral DAILY    folic acid (FOLVITE) tablet 1 mg  1 mg Oral DAILY    thiamine mononitrate (B-1) tablet 100 mg  100 mg Oral DAILY       Review of Systems: Admission ROS by Maria E Allen MD from 3/29/2021 were reviewed with the patient and changes (other than per HPI) include: none      Objective:     Physical Exam:  Visit Vitals  /68 (BP 1 Location: Right arm, BP Patient Position: At rest)   Pulse 89   Temp 98.5 °F (36.9 °C)   Resp 18   Ht 5' 6\" (1.676 m)   Wt 53.4 kg (117 lb 11.2 oz)   SpO2 98%   BMI 19.00 kg/m²     SpO2 Readings from Last 6 Encounters:   03/31/21 98%            Intake/Output Summary (Last 24 hours) at 3/31/2021 1203  Last data filed at 3/31/2021 1152  Gross per 24 hour   Intake 1350 ml   Output 1300 ml   Net 50 ml      General: no distress, comfortable  Skin:  No rash or palpable dermatologic mass lesions  HEENT: Pupils equal, sclera anicteric, oropharynx with no gross lesions  Cardiovascular: No abnormal audible heart sounds, well perfused, no edema  Respiratory: No abnormal audible breath sounds, normal respiratory effort, no throacic deformity  GI:  Abdomen nondistended, nontender, no mass, no free fluid, no rebound or guarding. Musculoskeletal:  No skeletal deformity nor acute arthritis noted. Neurological:  Motor and sensory function intact in upper extremeties  Psychiatric:  Normal affect, memory intact, appears to have insight into current illness. Tremulous  Lymphatic:  No cervical, supraclavicular, or periumbilic lymphadenopathy    Laboratory:    Recent Results (from the past 24 hour(s))   METABOLIC PANEL, COMPREHENSIVE    Collection Time: 03/31/21  3:20 AM   Result Value Ref Range    Sodium 135 (L) 136 - 145 mmol/L    Potassium 3.6 3.5 - 5.1 mmol/L    Chloride 103 97 - 108 mmol/L    CO2 28 21 - 32 mmol/L    Anion gap 4 (L) 5 - 15 mmol/L    Glucose 83 65 - 100 mg/dL    BUN 4 (L) 6 - 20 MG/DL    Creatinine 0.31 (L) 0.55 - 1.02 MG/DL    BUN/Creatinine ratio 13 12 - 20      GFR est AA >60 >60 ml/min/1.73m2    GFR est non-AA >60 >60 ml/min/1.73m2    Calcium 9.0 8.5 - 10.1 MG/DL    Bilirubin, total 0.7 0.2 - 1.0 MG/DL    ALT (SGPT) 121 (H) 12 - 78 U/L    AST (SGOT) 161 (H) 15 - 37 U/L    Alk.  phosphatase 104 45 - 117 U/L    Protein, total 6.1 (L) 6.4 - 8.2 g/dL    Albumin 3.1 (L) 3.5 - 5.0 g/dL    Globulin 3.0 2.0 - 4.0 g/dL    A-G Ratio 1.0 (L) 1.1 - 2.2     CBC WITH AUTOMATED DIFF    Collection Time: 03/31/21  3:20 AM   Result Value Ref Range    WBC 3.9 3.6 - 11.0 K/uL    RBC 3.13 (L) 3.80 - 5.20 M/uL    HGB 12.1 11.5 - 16.0 g/dL    HCT 35.0 35.0 - 47.0 %    .8 (H) 80.0 - 99.0 FL    MCH 38.7 (H) 26.0 - 34.0 PG    MCHC 34.6 30.0 - 36.5 g/dL    RDW 13.4 11.5 - 14.5 %    PLATELET 601 (L) 911 - 400 K/uL    MPV 10.8 8.9 - 12.9 FL    NRBC 0.0 0  WBC    ABSOLUTE NRBC 0.00 0.00 - 0.01 K/uL    NEUTROPHILS 48 32 - 75 %    LYMPHOCYTES 43 12 - 49 %    MONOCYTES 8 5 - 13 %    EOSINOPHILS 1 0 - 7 %    BASOPHILS 1 0 - 1 %    IMMATURE GRANULOCYTES 1 (H) 0.0 - 0.5 % ABS. NEUTROPHILS 1.9 1.8 - 8.0 K/UL    ABS. LYMPHOCYTES 1.7 0.8 - 3.5 K/UL    ABS. MONOCYTES 0.3 0.0 - 1.0 K/UL    ABS. EOSINOPHILS 0.0 0.0 - 0.4 K/UL    ABS. BASOPHILS 0.0 0.0 - 0.1 K/UL    ABS. IMM. GRANS. 0.0 0.00 - 0.04 K/UL    DF AUTOMATED     FOLATE    Collection Time: 03/31/21  3:20 AM   Result Value Ref Range    Folate 5.9 5.0 - 21.0 ng/mL   VITAMIN B12    Collection Time: 03/31/21  3:20 AM   Result Value Ref Range    Vitamin B12 980 193 - 986 pg/mL   SAMPLES BEING HELD    Collection Time: 03/31/21  3:20 AM   Result Value Ref Range    SAMPLES BEING HELD 1PST     COMMENT        Add-on orders for these samples will be processed based on acceptable specimen integrity and analyte stability, which may vary by analyte. Assessment/Plan:     Active Problems:    Sinus tachycardia (3/29/2021)      Hypokalemia (3/29/2021)      Lactic acidosis (3/29/2021)      Alcohol abuse (3/29/2021)      Hyponatremia (3/29/2021)      Thrombocytopenia (HCC) (3/29/2021)      Steatosis of liver (3/29/2021)      Generalized weakness (3/29/2021)      Elevated liver transaminase level (3/29/2021)      Alcohol withdrawal ([address] (3/30/2021)      Unintentional weight loss (3/30/2021)      Protein calorie malnutrition ([address] (3/30/2021)         See above narrative for full detail.

## 2021-03-31 NOTE — PROGRESS NOTES
Problem: Mobility Impaired (Adult and Pediatric)  Goal: *Acute Goals and Plan of Care (Insert Text)  Description: FUNCTIONAL STATUS PRIOR TO ADMISSION: Patient was independent and active without use of DME.    HOME SUPPORT PRIOR TO ADMISSION: The patient lived with spouse but did not require assist.    Physical Therapy Goals  Initiated 3/31/2021  1. Patient will move from supine to sit and sit to supine , scoot up and down, and roll side to side in bed with independence within 7 day(s). 2.  Patient will transfer from bed to chair and chair to bed with independence using the least restrictive device within 7 day(s). 3.  Patient will perform sit to stand with independence within 7 day(s). 4.  Patient will ambulate with independence for 200 feet with the least restrictive device within 7 day(s). 5.  Patient will ascend/descend 4 stairs with  handrail(s) with independence within 7 day(s). Outcome: Progressing Towards Goal   PHYSICAL THERAPY EVALUATION  Patient: Danielle Woodard (86 y.o. female)  Date: 3/31/2021  Primary Diagnosis: Elevated liver transaminase level [R74.01]  Steatosis of liver [K76.0]  Hypokalemia [E87.6]  Hyponatremia [E87.1]  Generalized weakness [R53.1]  Lactic acidosis [E87.2]  Sinus tachycardia [R00.0]  Thrombocytopenia (HCC) [D69.6]  Alcohol abuse [F10.10]        Precautions: fall       ASSESSMENT  Based on the objective data described below, the patient presents with difficulty with ambulation. Communicated with nurse cleared for therapy. Patient supine on bed when received spouse at bedside. Sit on the edge of bed mod assist, supine to sit mod assist, sit to stand mod assist, sitting balance fair, standing balance poor noted patient too shaky during standing. Ambulate few steps forward and backward hand held assist mod assist. Assisted back to bed supine. Performed some active range of motion exercise on both LE all planes.  Educate and  Instructed patient to continue active range of motion exercise on both legs while up on chair or on bed. Patient can use bedside commode if needed  with nursing staff. Communicated with nurse who agreed to monitor patient. Current Level of Function Impacting Discharge (mobility/balance): mod assist for transfers and ambulation hand held assist    Functional Outcome Measure: The patient scored 40/100 on the barthel index outcome measure . Other factors to consider for discharge: fall     Patient will benefit from skilled therapy intervention to address the above noted impairments. PLAN :  Recommendations and Planned Interventions: bed mobility training, transfer training, gait training, therapeutic exercises, neuromuscular re-education, orthotic/prosthetic training, patient and family training/education, and therapeutic activities      Frequency/Duration: Patient will be followed by physical therapy:  5 times a week to address goals. Recommendation for discharge: (in order for the patient to meet his/her long term goals)  Physical therapy at least 2 days/week in the home     This discharge recommendation:  Has been made in collaboration with the attending provider and/or case management    IF patient discharges home will need the following DME: to be determined (TBD)         SUBJECTIVE:   Patient stated I'm so hungry.     OBJECTIVE DATA SUMMARY:   HISTORY:    Past Medical History:   Diagnosis Date    Hypertension    No past surgical history on file. Personal factors and/or comorbidities impacting plan of care:          EXAMINATION/PRESENTATION/DECISION MAKING:   Critical Behavior:  Neurologic State: Alert  Orientation Level: Oriented X4  Cognition: Follows commands     Hearing:   Auditory  Auditory Impairment: None    Range Of Motion:  AROM: Generally decreased, functional           PROM: Generally decreased, functional           Strength:    Strength: Generally decreased, functional                    Tone & Sensation: Coordination:  Coordination: Generally decreased, functional  Vision:      Functional Mobility:  Bed Mobility:  Rolling: Moderate assistance  Supine to Sit: Moderate assistance  Sit to Supine: Moderate assistance  Scooting: Moderate assistance  Transfers:  Sit to Stand: Moderate assistance; Additional time  Stand to Sit: Moderate assistance; Additional time  Stand Pivot Transfers: Moderate assistance; Additional time                    Balance:   Sitting: High guard; Intact  Sitting - Static: Good (unsupported)  Sitting - Dynamic: Fair (occasional)  Standing: Impaired;Pull to stand; With support  Standing - Static: Poor  Standing - Dynamic : Poor  Ambulation/Gait Training:  Distance (ft): 2 Feet (ft)  Assistive Device: Gait belt(hand held assist)  Ambulation - Level of Assistance: Moderate assistance; Additional time     Gait Description (WDL): Exceptions to WDL  Gait Abnormalities: Path deviations; Step to gait        Base of Support: Widened     Speed/Davina: Fluctuations; Slow  Step Length: Right shortened;Left shortened                         Therapeutic Exercises:    Instructed patient to continue active range of motion exercise on both legs while up on chair or on bed. Functional Measure:  Barthel Index:    Bathin  Bladder: 5  Bowels: 10  Groomin  Dressin  Feeding: 10  Mobility: 0  Stairs: 0  Toilet Use: 5  Transfer (Bed to Chair and Back): 10  Total: 40/100       The Barthel ADL Index: Guidelines  1. The index should be used as a record of what a patient does, not as a record of what a patient could do. 2. The main aim is to establish degree of independence from any help, physical or verbal, however minor and for whatever reason. 3. The need for supervision renders the patient not independent. 4. A patient's performance should be established using the best available evidence.  Asking the patient, friends/relatives and nurses are the usual sources, but direct observation and common sense are also important. However direct testing is not needed. 5. Usually the patient's performance over the preceding 24-48 hours is important, but occasionally longer periods will be relevant. 6. Middle categories imply that the patient supplies over 50 per cent of the effort. 7. Use of aids to be independent is allowed. Terri Hernandez., Barthel, D.W. (1582). Functional evaluation: the Barthel Index. [address] [facility] (14)2. Lazarus Zelaya josé miguel JAUN Cuellar, Fiordaliza Jacome., Mallika Conley., [place], [address] (). Measuring the change indisability after inpatient rehabilitation; comparison of the responsiveness of the Barthel Index and Functional Cotton Measure. Journal of Neurology, Neurosurgery, and Psychiatry, 66(4), 625-938. Lester Keys, [place].VANESSA, EVGENY Dimas, & Sarah Swanson M.A. (2004.) Assessment of post-stroke quality of life in cost-effectiveness studies: The usefulness of the Barthel Index and the EuroQoL-5D. Quality of Life Research, 15, 198-33           Physical Therapy Evaluation Charge Determination   History Examination Presentation Decision-Making   LOW Complexity : Zero comorbidities / personal factors that will impact the outcome / POC MEDIUM Complexity : 3 Standardized tests and measures addressing body structure, function, activity limitation and / or participation in recreation  MEDIUM Complexity : Evolving with changing characteristics  Other outcome measures barthel  MEDIUM      Based on the above components, the patient evaluation is determined to be of the following complexity level: MEDIUM    Pain Ratin/10    Activity Tolerance:   Good    After treatment patient left in no apparent distress:   Supine in bed, Heels elevated for pressure relief, Call bell within reach, Bed / chair alarm activated, Caregiver / family present, and Side rails x 3    COMMUNICATION/EDUCATION:   The patients plan of care was discussed with: Registered nurse.      Fall prevention education was provided and the patient/caregiver indicated understanding. Thank you for this referral.  Kin Burleson, PT,WCC.    Time Calculation: 28 mins

## 2021-03-31 NOTE — PROGRESS NOTES
3/31/2021   5:14 PM  CM spoke w/ Dr Jonathan Badillo, pt not stable to DC d/t tachycardia, additional referrals for [place] sent to [facility], [address] [place] [address], [facility] Glaser, and [facility]. Will follow  YANI Case       4:58 PM  Additional referrals sent to [facility], [facility], [address] [place] [address], [facility]. All agencies are unable to accept due to insurance or staffing. CM notified Dr Jonathan Badillo. Pt  lives  w/  who is retired,option to  take her to outpatient YANI Chaudhari       4:07 PM  Order for [place] services noted, CM met w/ pt and , agree w/ plan, choice offered letter signed prefers [facility] and [facility], referrals sent in CC and [employer], await response. YANI Case       1:53 PM  CM met w/ pt and  for DC planning, CM wore mask and goggles at all times. Charted demographics verified, pt lives w/  in 3 story home, there are 6 entry steps and 13 interior steps to bed/bath. At baseline pt is ambulatory, iADLs, drives,  is retired and can assist w/ care and transport    Reason for Admission:  Elevated liver transaminase  Pt is a 46 yo  female who presents tp [facility] ED c/o malaise, fatigue, weakness for 4-5 days, associated nausea.   PMHx: HTN, ETOH abuse                   RUR Score:    8 % Low Risk                 Plan for utilizing home health:   No history, pt is not homebound  DME: pt has access to crutches  Rx: Edwina [address] Dr       PCP: First and Last name:  Dashawn Haywood MD     Name of Practice:    Are you a current patient: Yes/No: Yes   Approximate date of last visit: 30 days   Can you participate in a virtual visit with your PCP: NO                    Current Advanced Directive/Advance Care Plan: Full Code  NO AMD  NOK spouse Alivia Pierre) [phone]    Healthcare Decision Maker:   Click here to complete [employer] including selection of the [address] Relationship (ie \"Primary\")                             Transition of Care Plan:     RUR 8%  LOS 2 Days  1. Hospital admission for medical management  2. GI consult completed, plan for outpatient f/u  3. PT eval completed  4. CM to follow through for treatment/response            5. ACP planning, pt declined to complete on this admission  6. DC when stable to home w/ family assistance  7. Outpatient SA resources  8. Outpatient f/u PCP, GI  9.   Marsha Styles to transport    Care Management Interventions  PCP Verified by CM: Tierra Hedrick MD; last visit 30 days)  Palliative Care Criteria Met (RRAT>21 & CHF Dx)?: No  Mode of Transport at Discharge: Self(Family)  Physical Therapy Consult: Yes  Occupational Therapy Consult: No  Current Support Network: Lives with Spouse, Own Home(pt lives w/  in pvt residence, at baseline pt is ambulatory, iADLs, drives, family can assist)  Confirm Follow Up Transport: Family  Discharge Location  Discharge Placement: Home with outpatient services  YANI Leavitt

## 2021-03-31 NOTE — PROGRESS NOTES
Confirmed recpt of GI consult placed yesterday at 1755 to Dr. Alexandre Ramos with [facility].

## 2021-03-31 NOTE — PROGRESS NOTES
[facility] Dosing Services: 3/31/2021      Consult provided for this   45 y.o. , female , for the recommendation of ETOH withdrawal medication options. Dosing Weight:    Wt Readings from Last 1 Encounters:   03/31/21 53.4 kg (117 lb 11.2 oz)          DATE    Liver Enzymes Lab Results   Component Value Date/Time    ALT    161 03/31/2021 03:20 AM        MD would like to recommendation: Phenobarbital vs Librium vs Serax    Recommendations:  Avoid Phenobarbital and Librium due to longer half lives that can lead to oversedation in patients with liver disease. Options based on shorter half-life and/or absence of active metabolite that may prevent prolonged effects if oversedation occurs:    Lorazepam  Alcohol withdrawal syndrome (alternative agent) (off-label use): Note: Symptom-triggered regimens preferred over fixed-dose regimens. Dosage and frequency may vary based on institution-specific protocols. Although longer-acting benzodiazepines are preferred in general, shorter-acting benzodiazepines, including lorazepam, may be preferable in patients with impaired liver function. Some experts recommend avoiding IM administration due to variable absorption (García 2019; WFSBP [Soyka 2017]). Symptom-triggered regimen: Oral, IV: 2 to 4 mg as needed; dose and frequency determined by withdrawal symptom severity using a validated severity-assessment scale such as the [place] Withdrawal Assessment for Alcohol, revised scale (CIWA-Ar) (García 2019; WFSBP [Soyka 2017]). Fixed-dose regimen: Oral, IV: 1 to 4 mg every 4 to 6 hours for 1 day, then gradually taper dose over 3 to 4 days; additional doses may be considered based on withdrawal symptoms and validated assessment scale scores (eg, CIWA-Ar) (ASAM 2020; Melissa Hong 2002; Jacob 2009; WFSBP [Soyka 2017]). Oxazepam  Alcohol withdrawal syndrome:  Note: Symptom-triggered regimens are preferred over fixed-dose regimens (WFSBP [Soyka 2017]).  Dosage and frequency may vary based on institution-specific protocols. Symptom-triggered regimen: Oral: 10 to 30 mg as needed per institution-specific protocol until appropriate sedation achieved; dose and frequency are determined by withdrawal symptom severity using a validated severity assessment scale, such as the [address] for Alcohol, revised scale (CIWA-Ar) (Alisia Severance 2002; García 2019; WFSBP [Soyka 2017]). Fixed-dose regimen: Oral: 30 mg every 6 hours for 1 day, then 15 mg every 6 hours for 2 days; additional doses may be considered based on withdrawal symptoms and validated assessment scale scores (eg, CIWA-Ar) (Alisia Severance 2002; Ricky 2019; WFSBP [Soyka 2017]). Resources:   1)Up to Date  2)Buster Rucker. Update on Phenobarbital for Alcohol Withdrawal Syndrome in Intensive Care. J Clin Intensive Care Med. 2019; 4: 036-039. DOI: 10.90643/journal.jcicm. 1817281    Signed Katherine Rogel 52 information:  [phone]

## 2021-04-01 LAB
ALBUMIN SERPL-MCNC: 3.1 G/DL (ref 3.5–5)
ALBUMIN/GLOB SERPL: 1 {RATIO} (ref 1.1–2.2)
ALP SERPL-CCNC: 99 U/L (ref 45–117)
ALT SERPL-CCNC: 98 U/L (ref 12–78)
ANION GAP SERPL CALC-SCNC: 6 MMOL/L (ref 5–15)
AST SERPL-CCNC: 92 U/L (ref 15–37)
BASOPHILS # BLD: 0 K/UL (ref 0–0.1)
BASOPHILS NFR BLD: 0 % (ref 0–1)
BILIRUB SERPL-MCNC: 0.6 MG/DL (ref 0.2–1)
BUN SERPL-MCNC: 5 MG/DL (ref 6–20)
BUN/CREAT SERPL: 14 (ref 12–20)
CALCIUM SERPL-MCNC: 9.2 MG/DL (ref 8.5–10.1)
CHLORIDE SERPL-SCNC: 102 MMOL/L (ref 97–108)
CO2 SERPL-SCNC: 26 MMOL/L (ref 21–32)
CREAT SERPL-MCNC: 0.35 MG/DL (ref 0.55–1.02)
D DIMER PPP FEU-MCNC: 0.62 MG/L FEU (ref 0–0.65)
DIFFERENTIAL METHOD BLD: ABNORMAL
EOSINOPHIL # BLD: 0 K/UL (ref 0–0.4)
EOSINOPHIL NFR BLD: 1 % (ref 0–7)
ERYTHROCYTE [DISTWIDTH] IN BLOOD BY AUTOMATED COUNT: 13.2 % (ref 11.5–14.5)
FERRITIN SERPL-MCNC: 1279 NG/ML (ref 26–388)
GLOBULIN SER CALC-MCNC: 3.1 G/DL (ref 2–4)
GLUCOSE SERPL-MCNC: 89 MG/DL (ref 65–100)
HCT VFR BLD AUTO: 36.8 % (ref 35–47)
HGB BLD-MCNC: 12.9 G/DL (ref 11.5–16)
IMM GRANULOCYTES # BLD AUTO: 0 K/UL (ref 0–0.04)
IMM GRANULOCYTES NFR BLD AUTO: 0 % (ref 0–0.5)
IRON SATN MFR SERPL: 35 % (ref 20–50)
IRON SERPL-MCNC: 74 UG/DL (ref 35–150)
LYMPHOCYTES # BLD: 1.6 K/UL (ref 0.8–3.5)
LYMPHOCYTES NFR BLD: 43 % (ref 12–49)
MAGNESIUM SERPL-MCNC: 2.3 MG/DL (ref 1.6–2.4)
MCH RBC QN AUTO: 38.5 PG (ref 26–34)
MCHC RBC AUTO-ENTMCNC: 35.1 G/DL (ref 30–36.5)
MCV RBC AUTO: 109.9 FL (ref 80–99)
MONOCYTES # BLD: 0.4 K/UL (ref 0–1)
MONOCYTES NFR BLD: 10 % (ref 5–13)
NEUTS SEG # BLD: 1.8 K/UL (ref 1.8–8)
NEUTS SEG NFR BLD: 46 % (ref 32–75)
NRBC # BLD: 0 K/UL (ref 0–0.01)
NRBC BLD-RTO: 0 PER 100 WBC
PHOSPHATE SERPL-MCNC: 3.9 MG/DL (ref 2.6–4.7)
PLATELET # BLD AUTO: 139 K/UL (ref 150–400)
PMV BLD AUTO: 10.9 FL (ref 8.9–12.9)
POTASSIUM SERPL-SCNC: 3.8 MMOL/L (ref 3.5–5.1)
PROT SERPL-MCNC: 6.2 G/DL (ref 6.4–8.2)
RBC # BLD AUTO: 3.35 M/UL (ref 3.8–5.2)
RBC MORPH BLD: ABNORMAL
RBC MORPH BLD: ABNORMAL
SODIUM SERPL-SCNC: 134 MMOL/L (ref 136–145)
TIBC SERPL-MCNC: 212 UG/DL (ref 250–450)
WBC # BLD AUTO: 3.8 K/UL (ref 3.6–11)

## 2021-04-01 PROCEDURE — 74011250636 HC RX REV CODE- 250/636: Performed by: INTERNAL MEDICINE

## 2021-04-01 PROCEDURE — 82390 ASSAY OF CERULOPLASMIN: CPT

## 2021-04-01 PROCEDURE — 97116 GAIT TRAINING THERAPY: CPT

## 2021-04-01 PROCEDURE — 82103 ALPHA-1-ANTITRYPSIN TOTAL: CPT

## 2021-04-01 PROCEDURE — 74011250637 HC RX REV CODE- 250/637: Performed by: HOSPITALIST

## 2021-04-01 PROCEDURE — 85025 COMPLETE CBC W/AUTO DIFF WBC: CPT

## 2021-04-01 PROCEDURE — 97530 THERAPEUTIC ACTIVITIES: CPT

## 2021-04-01 PROCEDURE — 65660000000 HC RM CCU STEPDOWN

## 2021-04-01 PROCEDURE — 82728 ASSAY OF FERRITIN: CPT

## 2021-04-01 PROCEDURE — 84425 ASSAY OF VITAMIN B-1: CPT

## 2021-04-01 PROCEDURE — 80053 COMPREHEN METABOLIC PANEL: CPT

## 2021-04-01 PROCEDURE — 83540 ASSAY OF IRON: CPT

## 2021-04-01 PROCEDURE — 36415 COLL VENOUS BLD VENIPUNCTURE: CPT

## 2021-04-01 PROCEDURE — 86038 ANTINUCLEAR ANTIBODIES: CPT

## 2021-04-01 PROCEDURE — 85379 FIBRIN DEGRADATION QUANT: CPT

## 2021-04-01 PROCEDURE — 83516 IMMUNOASSAY NONANTIBODY: CPT

## 2021-04-01 PROCEDURE — 74011250637 HC RX REV CODE- 250/637: Performed by: INTERNAL MEDICINE

## 2021-04-01 PROCEDURE — 83735 ASSAY OF MAGNESIUM: CPT

## 2021-04-01 PROCEDURE — 84100 ASSAY OF PHOSPHORUS: CPT

## 2021-04-01 RX ORDER — ENOXAPARIN SODIUM 100 MG/ML
40 INJECTION SUBCUTANEOUS EVERY 24 HOURS
Status: DISCONTINUED | OUTPATIENT
Start: 2021-04-01 | End: 2021-04-02 | Stop reason: HOSPADM

## 2021-04-01 RX ORDER — LORAZEPAM 0.5 MG/1
0.5 TABLET ORAL 2 TIMES DAILY
Status: DISCONTINUED | OUTPATIENT
Start: 2021-04-01 | End: 2021-04-02 | Stop reason: HOSPADM

## 2021-04-01 RX ADMIN — LISINOPRIL 5 MG: 5 TABLET ORAL at 10:03

## 2021-04-01 RX ADMIN — ENOXAPARIN SODIUM 40 MG: 40 INJECTION SUBCUTANEOUS at 20:46

## 2021-04-01 RX ADMIN — LEVOTHYROXINE SODIUM 25 MCG: 0.03 TABLET ORAL at 06:45

## 2021-04-01 RX ADMIN — FOLIC ACID 1 MG: 1 TABLET ORAL at 10:03

## 2021-04-01 RX ADMIN — LORAZEPAM 0.5 MG: 0.5 TABLET ORAL at 10:03

## 2021-04-01 RX ADMIN — LORAZEPAM 0.5 MG: 0.5 TABLET ORAL at 20:46

## 2021-04-01 RX ADMIN — THIAMINE HCL TAB 100 MG 100 MG: 100 TAB at 10:03

## 2021-04-01 RX ADMIN — THERA TABS 1 TABLET: TAB at 10:03

## 2021-04-01 NOTE — PROGRESS NOTES
Bedside and Verbal shift change report given to Meka Monreal (oncoming nurse) by Preet Merritt (offgoing nurse). Report included the following information SBAR, Kardex and Cardiac Rhythm NSR-ST. Bedside and Verbal shift change report given to Emeli Gar (oncoming nurse) by Meka Monreal (offgoing nurse). Report included the following information SBAR, Kardex and Cardiac Rhythm NSR. This patient was assisted with Intentional Toileting every 2 hours during this shift. Documentation of ambulation and output reflected on Flowsheet.

## 2021-04-01 NOTE — PROGRESS NOTES
Problem: Mobility Impaired (Adult and Pediatric)  Goal: *Acute Goals and Plan of Care (Insert Text)  Description: FUNCTIONAL STATUS PRIOR TO ADMISSION: Patient was independent and active without use of DME.    HOME SUPPORT PRIOR TO ADMISSION: The patient lived with spouse but did not require assist.    Physical Therapy Goals  Initiated 3/31/2021  1. Patient will move from supine to sit and sit to supine , scoot up and down, and roll side to side in bed with independence within 7 day(s). 2.  Patient will transfer from bed to chair and chair to bed with independence using the least restrictive device within 7 day(s). 3.  Patient will perform sit to stand with independence within 7 day(s). 4.  Patient will ambulate with independence for 200 feet with the least restrictive device within 7 day(s). 5.  Patient will ascend/descend 4 stairs with  handrail(s) with independence within 7 day(s). Note:   PHYSICAL THERAPY TREATMENT  Patient: Anand Roman (73 y.o. female)  Date: 4/1/2021  Diagnosis: Elevated liver transaminase level [R74.01]  Steatosis of liver [K76.0]  Hypokalemia [E87.6]  Hyponatremia [E87.1]  Generalized weakness [R53.1]  Lactic acidosis [E87.2]  Sinus tachycardia [R00.0]  Thrombocytopenia (HCC) [D69.6]  Alcohol abuse [F10.10] <principal problem not specified>       Precautions:    Chart, physical therapy assessment, plan of care and goals were reviewed. ASSESSMENT  Patient continues with skilled PT services. Pt supine to sit with CGA. Pt sit to stand with CGA. Pt ambulated. Pt is unsteady standing with knees flexed. Pt ambulated 40ft with mod assist hand held. Pt left sitting in chair. Pt to try RW next treatment. Pt progressing slowly. Continue goals. Current Level of Function Impacting Discharge (mobility/balance): Pt mod assist ambulating hand held. PLAN :  Patient continues to benefit from skilled intervention to address the above impairments.   Continue treatment per established plan of care. to address goals. Recommendation for discharge: (in order for the patient to meet his/her long term goals)  To be determined:      This discharge recommendation:  Has been made in collaboration with the attending provider and/or case management    IF patient discharges home will need the following DME: MARILYN       SUBJECTIVE:       OBJECTIVE DATA SUMMARY:   Critical Behavior:  Neurologic State: Alert  Orientation Level: Oriented X4  Cognition: Follows commands, Appropriate decision making, Appropriate for age attention/concentration, Appropriate safety awareness     Functional Mobility Training:  Bed Mobility:     Supine to Sit: Contact guard assistance              Transfers:  Sit to Stand: Contact guard assistance  Stand to Sit: Contact guard assistance;Minimum assistance                             Balance:  Sitting: Intact  Sitting - Static: Good (unsupported)  Standing: With support  Standing - Static: Fair  Ambulation/Gait Training:  Distance (ft): 40 Feet (ft)  Assistive Device: Gait belt  Ambulation - Level of Assistance: Minimal assistance        Gait Abnormalities: Decreased step clearance;Trunk sway increased        Base of Support: Narrowed     Speed/Davina: Pace decreased (<100 feet/min)  Step Length: Right shortened;Left shortened                   Activity Tolerance:   Fair    After treatment patient left in no apparent distress:   Sitting in chair    COMMUNICATION/COLLABORATION:   The patients plan of care was discussed with: Physical therapist.     Layla Guzman PTA   Time Calculation: 23 mins

## 2021-04-01 NOTE — PROGRESS NOTES
Jimmie Kelly adis [place] [address]  [address] [facility], [address] [place] [address]  [phone]      Medical Progress Note      NAME: Pilar Aguayo   :  1970  MRM:  [id number]    Date/Time: 2021        Assessment / Plan:     44 yo F w/ hx of HTN, ETOH abuse presenting with fatigue, abnormal liver enzymes from urgent care, admitted for alcohol withdrawal. Hospital course complicated by problems as listed below:    Alcohol withdrawal/ tachycardia: Tachycardic into 140s with ambulation. On low dose scheduled PO Ativan, will try to wean off. CIWA with IV Ativan Prn. S/p goody bag. Continue folate and thiamine. Seizure precations.      Tachycardia: likely from ETOH w/d, debility, Ddx however vast, include volume depletion, pain, infection (SIRS/sepsis), less likely DVT/PE, hyperthyroidism, ADHF, anxiety, etc. D-dimer negative, unlikely VTE. No fever or pyuria or infiltrates on CXR. Check TTE. Stop ACEi    Acute hepatitis: Likely due to alcohol. Viral hepatitis panel unremarkable. More serologies pending per GI. US showed hepatic steatosis and mildly dilated common bile duct, with no visualized choledocholithiasis. Evaluated by GI. Follow LFTs     Hypothyroidism: subclinical hypothyroidism. TSH high but <10 and FT4 low/normal. Low-dose levothyroxine started. Outpatient follow-up, repeat TSH / FT4 in 2 weeks     Hypokalemia/hyponatremia: improved. Likely due to alcohol abuse. Follow BMP     Weakness: Treat hypothyroidism. Correct electrolyte deficiencies. PT/OT. HH if needed    Thrombocytopenia:  Likely due to alcohol abuse. Monitor.     Macrocytic anemia: borderline at best. Likely 2/2 ETOH. B12 and folate WNR. Follow Hg     Unintentional weight loss and possible protein calorie malnutrition due to poor oral intake:  suspect due to alcohol abuse. Nutrition following. GI evaluted; f/u OP.  Recommend age-appropriate CA screening outpatient including colonoscopy, mammogram, cervical CA screening, low res CT given smoking hx, etc    Tobacco abuse: Recommended smoking cessation. Discussed possible pharmacotherapy including nicotine replacement therapy, spent ~4 minutes (outside of the time documented for this note) exclusively focused on tobacco cessation counseling        Total time spent:  35 minutes  Time spent in the care of this patient including reviewing records, discussing with nursing and/or other providers on the treatment team, obtaining history and examining the patient, and discussing treatment plans. Care Plan discussed with: Patient, Nursing Staff and >50% of time spent in counseling and coordination of care    Discussed:  Care Plan and D/C Planning    Prophylaxis:  Lovenox    Disposition:  Home w/Family         Subjective:     Chief Complaint:  Follow up alcohol w/d    Chart/notes/labs/studies reviewed, patient examined. Feels a bit anxious. No CP. No SOB. Generalized weakness. No fevers              Objective:       Vitals:        Last 24hrs VS reviewed since prior progress note. Most recent are:    Visit Vitals  /75 (BP 1 Location: Left arm, BP Patient Position: At rest)   Pulse 98   Temp 97.6 °F (36.4 °C)   Resp 16   Ht 5' 6\" (1.676 m)   Wt 53.4 kg (117 lb 11.2 oz)   SpO2 96%   BMI 19.00 kg/m²     SpO2 Readings from Last 6 Encounters:   04/01/21 96%            Intake/Output Summary (Last 24 hours) at 4/1/2021 0835  Last data filed at 4/1/2021 0649  Gross per 24 hour   Intake 1360 ml   Output 800 ml   Net 560 ml          Exam:     Physical Exam:    Gen:  Chronically ill-appearing. NAD  HEENT:  Atraumatic, normocephalic. Sclerae nonicteric, hearing intact to voice  Neck:  Supple, without apparent masses. Resp:  No accessory muscle use, CTAB without wheezes, rales, or rhonchi  Card: HR 100s, without m/r/g. No LE edema  Abd:  +bowel sounds, soft, NTTP, nondistended. Neuro:  Face symmetric, speech fluent, follows commands appropriately, no focal weakness or numbness  Psych:  Alert, oriented x 3. Good insight     Medications Reviewed: (see below)    Lab Data Reviewed: (see below)    ______________________________________________________________________    Medications:     Current Facility-Administered Medications   Medication Dose Route Frequency    LORazepam (ATIVAN) tablet 0.5 mg  0.5 mg Oral BID    LORazepam (ATIVAN) injection 1 mg  1 mg IntraVENous Q1H PRN    LORazepam (ATIVAN) injection 2 mg  2 mg IntraVENous Q1H PRN    lisinopriL (PRINIVIL, ZESTRIL) tablet 5 mg  5 mg Oral DAILY    levothyroxine (SYNTHROID) tablet 25 mcg  25 mcg Oral ACB    prochlorperazine (COMPAZINE) injection 10 mg  10 mg IntraMUSCular Q6H PRN    therapeutic multivitamin (THERAGRAN) tablet 1 Tab  1 Tab Oral DAILY    folic acid (FOLVITE) tablet 1 mg  1 mg Oral DAILY    thiamine mononitrate (B-1) tablet 100 mg  100 mg Oral DAILY            Lab Review:     Recent Labs     04/01/21  0350 03/31/21 0320 03/30/21 0329   WBC 3.8 3.9 4.0   HGB 12.9 12.1 11.4*   HCT 36.8 35.0 31.9*   * 128* 125*     Recent Labs     04/01/21  0350 03/31/21 0320 03/30/21 0329 03/29/21 2013   * 135* 137 132*   K 3.8 3.6 3.4* 3.0*    103 104 94*   CO2 26 28 26 27   GLU 89 83 79 103*   BUN 5* 4* 3* 5*   CREA 0.35* 0.31* 0.27* 0.50*   CA 9.2 9.0 7.5* 8.2*   MG 2.3  --  2.0 2.2   PHOS 3.9  --   --   --    ALB 3.1* 3.1* 3.0* 3.5   ALT 98* 121* 141* 186*   INR  --   --  1.1 1.0     No components found for: GLPOC  No results for input(s): PH, PCO2, PO2, HCO3, FIO2 in the last 72 hours.   Recent Labs     03/30/21  0329 03/29/21 2013   INR 1.1 1.0     No results found for: SDES  Lab Results   Component Value Date/Time    Culture result: NO GROWTH 3 DAYS 03/29/2021 08:13 PM              ___________________________________________________    Attending Physician: Nella Mcmullen MD

## 2021-04-01 NOTE — PROGRESS NOTES
Interdisciplinary Rounds were completed on the patient concurrent with CHF rounds. Patient's needs for facilitating discharge were discussed as were potential discharge medications, needs, equipment, follow up appointments, and education. Patient given Leave Smart discharge tool and educated on use during discharge process.

## 2021-04-01 NOTE — PROGRESS NOTES
Bedside shift change report given to Varsha Quintanilla RN (oncoming nurse) by Cathy Alamo RN (offgoing nurse). Report included the following information SBAR, Kardex, ED Summary, MAR, Recent Results and Cardiac Rhythm NSR. This patient was assisted with Intentional Toileting every 2 hours during this shift. Documentation of ambulation and output reflected on Flowsheet. Bedside shift change report given to Kayleigh Carrero (oncoming nurse) by Varsha Quintanilla RN (offgoing nurse). Report included the following information SBAR, Kardex, ED Summary, MAR, Recent Results and Cardiac Rhythm NSR.

## 2021-04-01 NOTE — PROGRESS NOTES
4/1/2021   4:41 PM  HH order faxed to [facility] intake [phone], await response. CM updated pt on DC plan. YANI Metcalf       3:19 PM  Pt discussed in IDR rounds, is continuing to require medical management for ETOH withdrawal, tachycardia, acute hepatitis, hypothyroidism, hypokalemia, hyponatremia, weakness. Transition of Care Plan:     RUR 9%  LOS 3 Days  1.medical management continues  2. CM to follow through for treatment/response      3. 15 HH agencies contacted for [facility] services , CM contacted [facility] [phone] spoke w/ Winston Oiler can accept pt for SN, referral faxed to [facility] [phone], they are unable to provide home PT/OT pt will have to go to outpatient PT, [facility] is in network, will need Rx  4.. DC when stable to home w/ family assistance and HH SN  7. Outpatient [facility] resources, pt declined   8. Outpatient f/u PCP, GI  9.   Israel Rudd to transport  YANI Metcalf

## 2021-04-01 NOTE — ROUTINE PROCESS
Bedside shift change report given to Nohemi Garcia (oncoming nurse) by Chase Odom (offgoing nurse). Report included the following information SBAR, Kardex, Intake/Output, MAR, Accordion and Recent Results. This patient was assisted with Intentional Toileting every 2 hours during this shift. Documentation of ambulation and output reflected on Flowsheet.

## 2021-04-02 ENCOUNTER — APPOINTMENT (OUTPATIENT)
Dept: NON INVASIVE DIAGNOSTICS | Age: 51
DRG: 897 | End: 2021-04-02
Attending: INTERNAL MEDICINE
Payer: COMMERCIAL

## 2021-04-02 VITALS
HEART RATE: 98 BPM | OXYGEN SATURATION: 97 % | HEIGHT: 66 IN | BODY MASS INDEX: 18.88 KG/M2 | WEIGHT: 117.5 LBS | DIASTOLIC BLOOD PRESSURE: 76 MMHG | TEMPERATURE: 98.3 F | SYSTOLIC BLOOD PRESSURE: 112 MMHG | RESPIRATION RATE: 18 BRPM

## 2021-04-02 LAB
A1AT SERPL-MCNC: 153 MG/DL (ref 101–187)
ALBUMIN SERPL-MCNC: 3.2 G/DL (ref 3.5–5)
ALBUMIN/GLOB SERPL: 1 {RATIO} (ref 1.1–2.2)
ALP SERPL-CCNC: 97 U/L (ref 45–117)
ALT SERPL-CCNC: 87 U/L (ref 12–78)
ANA SER QL: NEGATIVE
ANION GAP SERPL CALC-SCNC: 8 MMOL/L (ref 5–15)
AST SERPL-CCNC: 73 U/L (ref 15–37)
BASOPHILS # BLD: 0 K/UL (ref 0–0.1)
BASOPHILS NFR BLD: 0 % (ref 0–1)
BILIRUB SERPL-MCNC: 0.5 MG/DL (ref 0.2–1)
BUN SERPL-MCNC: 7 MG/DL (ref 6–20)
BUN/CREAT SERPL: 19 (ref 12–20)
CALCIUM SERPL-MCNC: 9.1 MG/DL (ref 8.5–10.1)
CERULOPLASMIN SERPL-MCNC: 18.5 MG/DL (ref 19–39)
CHLORIDE SERPL-SCNC: 104 MMOL/L (ref 97–108)
CO2 SERPL-SCNC: 24 MMOL/L (ref 21–32)
CREAT SERPL-MCNC: 0.36 MG/DL (ref 0.55–1.02)
DIFFERENTIAL METHOD BLD: ABNORMAL
ECHO AO ASC DIAM: 3.06 CM
ECHO AO ROOT DIAM: 3.18 CM
ECHO AV AREA PEAK VELOCITY: 2.28 CM2
ECHO AV AREA/BSA PEAK VELOCITY: 1.4 CM2/M2
ECHO AV PEAK GRADIENT: 4.74 MMHG
ECHO AV PEAK VELOCITY: 108.84 CM/S
ECHO IVC PROX: 0.85 CM
ECHO LA AREA 4C: 4 CM2
ECHO LA MAJOR AXIS: 2.04 CM
ECHO LA MINOR AXIS: 1.28 CM
ECHO LA VOL 2C: 7.91 ML (ref 22–52)
ECHO LA VOL 4C: 4.85 ML (ref 22–52)
ECHO LA VOL BP: 7.63 ML (ref 22–52)
ECHO LA VOL/BSA BIPLANE: 4.78 ML/M2 (ref 16–28)
ECHO LA VOLUME INDEX A2C: 4.96 ML/M2 (ref 16–28)
ECHO LA VOLUME INDEX A4C: 3.04 ML/M2 (ref 16–28)
ECHO LV E' LATERAL VELOCITY: 10.93 CENTIMETER/SECOND
ECHO LV E' SEPTAL VELOCITY: 7.77 CENTIMETER/SECOND
ECHO LV INTERNAL DIMENSION DIASTOLIC: 3.42 CM (ref 3.9–5.3)
ECHO LV INTERNAL DIMENSION SYSTOLIC: 2.65 CM
ECHO LV IVSD: 0.77 CM (ref 0.6–0.9)
ECHO LV MASS 2D: 69.5 G (ref 67–162)
ECHO LV MASS INDEX 2D: 43.5 G/M2 (ref 43–95)
ECHO LV POSTERIOR WALL DIASTOLIC: 0.78 CM (ref 0.6–0.9)
ECHO LVOT DIAM: 2.09 CM
ECHO LVOT PEAK GRADIENT: 2.11 MMHG
ECHO LVOT PEAK VELOCITY: 72.68 CM/S
ECHO MV A VELOCITY: 64.37 CENTIMETER/SECOND
ECHO MV AREA PHT: 6.25 CM2
ECHO MV E DECELERATION TIME (DT): 121.48 MS
ECHO MV E VELOCITY: 59.45 CENTIMETER/SECOND
ECHO MV PRESSURE HALF TIME (PHT): 35.23 MS
ECHO PV MAX VELOCITY: 103.98 CM/S
ECHO PV PEAK INSTANTANEOUS GRADIENT SYSTOLIC: 4.32 MMHG
ECHO RV INTERNAL DIMENSION: 2.73 CM
ECHO RV TAPSE: 1.53 CM (ref 1.5–2)
EOSINOPHIL # BLD: 0.1 K/UL (ref 0–0.4)
EOSINOPHIL NFR BLD: 2 % (ref 0–7)
ERYTHROCYTE [DISTWIDTH] IN BLOOD BY AUTOMATED COUNT: 13.1 % (ref 11.5–14.5)
GLOBULIN SER CALC-MCNC: 3.2 G/DL (ref 2–4)
GLUCOSE SERPL-MCNC: 88 MG/DL (ref 65–100)
HAV IGM SER QL: NONREACTIVE
HBV CORE IGM SER QL: NONREACTIVE
HBV SURFACE AG SER QL: <0.1 INDEX
HBV SURFACE AG SER QL: NEGATIVE
HCT VFR BLD AUTO: 37.2 % (ref 35–47)
HCV AB SERPL QL IA: NONREACTIVE
HCV COMMENT,HCGAC: NORMAL
HGB BLD-MCNC: 12.9 G/DL (ref 11.5–16)
IMM GRANULOCYTES # BLD AUTO: 0.1 K/UL (ref 0–0.04)
IMM GRANULOCYTES NFR BLD AUTO: 1 % (ref 0–0.5)
LA VOL DISK BP: 6.42 ML (ref 22–52)
LYMPHOCYTES # BLD: 2.3 K/UL (ref 0.8–3.5)
LYMPHOCYTES NFR BLD: 47 % (ref 12–49)
MAGNESIUM SERPL-MCNC: 2.2 MG/DL (ref 1.6–2.4)
MCH RBC QN AUTO: 38.7 PG (ref 26–34)
MCHC RBC AUTO-ENTMCNC: 34.7 G/DL (ref 30–36.5)
MCV RBC AUTO: 111.7 FL (ref 80–99)
MONOCYTES # BLD: 0.5 K/UL (ref 0–1)
MONOCYTES NFR BLD: 11 % (ref 5–13)
NEUTS SEG # BLD: 1.9 K/UL (ref 1.8–8)
NEUTS SEG NFR BLD: 39 % (ref 32–75)
NRBC # BLD: 0 K/UL (ref 0–0.01)
NRBC BLD-RTO: 0 PER 100 WBC
PHOSPHATE SERPL-MCNC: 4.2 MG/DL (ref 2.6–4.7)
PLATELET # BLD AUTO: 159 K/UL (ref 150–400)
PMV BLD AUTO: 10.7 FL (ref 8.9–12.9)
POTASSIUM SERPL-SCNC: 3.5 MMOL/L (ref 3.5–5.1)
PROT SERPL-MCNC: 6.4 G/DL (ref 6.4–8.2)
RBC # BLD AUTO: 3.33 M/UL (ref 3.8–5.2)
RBC MORPH BLD: ABNORMAL
SODIUM SERPL-SCNC: 136 MMOL/L (ref 136–145)
SP1: NORMAL
SP2: NORMAL
SP3: NORMAL
WBC # BLD AUTO: 4.9 K/UL (ref 3.6–11)

## 2021-04-02 PROCEDURE — 74011250637 HC RX REV CODE- 250/637: Performed by: HOSPITALIST

## 2021-04-02 PROCEDURE — 74011250637 HC RX REV CODE- 250/637: Performed by: INTERNAL MEDICINE

## 2021-04-02 PROCEDURE — 83735 ASSAY OF MAGNESIUM: CPT

## 2021-04-02 PROCEDURE — 80053 COMPREHEN METABOLIC PANEL: CPT

## 2021-04-02 PROCEDURE — 36415 COLL VENOUS BLD VENIPUNCTURE: CPT

## 2021-04-02 PROCEDURE — 85025 COMPLETE CBC W/AUTO DIFF WBC: CPT

## 2021-04-02 PROCEDURE — 93306 TTE W/DOPPLER COMPLETE: CPT

## 2021-04-02 PROCEDURE — 84100 ASSAY OF PHOSPHORUS: CPT

## 2021-04-02 RX ORDER — ASPIRIN 325 MG/1
100 TABLET, FILM COATED ORAL DAILY
Qty: 30 TAB | Refills: 0 | Status: SHIPPED | OUTPATIENT
Start: 2021-04-03

## 2021-04-02 RX ORDER — LEVOTHYROXINE SODIUM 25 UG/1
25 TABLET ORAL
Qty: 30 TAB | Refills: 0 | Status: SHIPPED | OUTPATIENT
Start: 2021-04-03

## 2021-04-02 RX ORDER — FOLIC ACID 1 MG/1
1 TABLET ORAL DAILY
Qty: 30 TAB | Refills: 0 | Status: SHIPPED | OUTPATIENT
Start: 2021-04-03

## 2021-04-02 RX ORDER — THERA TABS 400 MCG
1 TAB ORAL DAILY
Qty: 30 TAB | Refills: 0 | Status: SHIPPED | OUTPATIENT
Start: 2021-04-03

## 2021-04-02 RX ADMIN — LEVOTHYROXINE SODIUM 25 MCG: 0.03 TABLET ORAL at 08:51

## 2021-04-02 RX ADMIN — FOLIC ACID 1 MG: 1 TABLET ORAL at 08:51

## 2021-04-02 RX ADMIN — THIAMINE HCL TAB 100 MG 100 MG: 100 TAB at 08:51

## 2021-04-02 RX ADMIN — THERA TABS 1 TABLET: TAB at 08:51

## 2021-04-02 RX ADMIN — LORAZEPAM 0.5 MG: 0.5 TABLET ORAL at 08:51

## 2021-04-02 NOTE — PROGRESS NOTES
Bedside and Verbal shift change report given to Joseph (oncoming nurse) by Abbie Mckeon (offgoing nurse). Report included the following information SBAR, Kardex, Intake/Output, MAR, Recent Results and Cardiac Rhythm sinus tach. Patient in bed sleeping. CIWA score 2 - tremors. Patient HR in 160s when ambulating and returns to resting rate. She is weak/tremorus and needs a one assist to help her ambulate. VSS. No complaints of pain overnight.

## 2021-04-02 NOTE — DISCHARGE INSTRUCTIONS
HOSPITALIST DISCHARGE INSTRUCTIONS  NAME: Emani Guajardo   :  1970   MRN:  [id number]     Date/Time:  2021 12:53 PM    ADMIT DATE: 3/29/2021     DISCHARGE DATE: 2021     PRINCIPAL DISCHARGE DIAGNOSES:      MEDICATIONS:  · It is important that you take the medication exactly as they are prescribed. Note the changes and additions to your medications. Be sure you understand these changes before you are discharged today. · Keep your medication in the bottles provided by the pharmacist and keep a list of the medication names, dosages, and times to be taken in your wallet. · Do not take other medications without consulting your doctor. Pain Management: per above medications    What to do at Home    Recommended diet:  Regular Diet    Recommended activity: Activity as tolerated, fall precautions    If you experience any of the following symptoms then please call your primary care physician or return to the emergency room if you cannot get hold of your doctor:  Fever, chills, severe abdominal pain, nausea, vomiting, diarrhea, confusion, weakness, falling, bleeding, severe chest pain, shortness of breath, or other severe concerning symptoms such as severe anxiety, tremors, shaking, withdrawal symptoms    Follow Up: Follow-up Information     Follow up With Specialties Details Why Contact Info    [facility]   Outpatient [place] [address]  [phone]    Graciela Sainz MD Family Medicine In 3 days  [address] [phone]              Information obtained by :  I understand that if any problems occur once I am at home I am to contact my physician. I understand and acknowledge receipt of the instructions indicated above.                                                                                                                                            Physician's or R.N.'s Signature Date/Time                                                                                                                                              Patient or Representative Signature                                                          Date/Time

## 2021-04-02 NOTE — PROGRESS NOTES
CM follow up:    Call received from [facility], they are unable to accept patient for nursing services due to service area. CM has been unable to find home health coverage for this patient due to inability to find providers within the [place] [address] (see CM note from yesterday). Would recommend outpatient follow up for PT, and physician follow up. Patient will need prescription for outpatient PT.     Darren Goldsmith RN, MSN/Care manager  [phone]

## 2021-04-02 NOTE — PROGRESS NOTES
1650-I have reviewed discharge instructions with the patient and spouse. The patient and spouse verbalized understanding.

## 2021-04-02 NOTE — DISCHARGE SUMMARY
Physician Discharge Summary     Patient ID:  Bigg Monaco  [id number]  45 y.o.  1970    Admit date: 3/29/2021    Discharge date: 4/2/2021    Admission Diagnoses: Elevated liver transaminase level [R74.01]  Steatosis of liver [K76.0]  Hypokalemia [E87.6]  Hyponatremia [E87.1]  Generalized weakness [R53.1]  Lactic acidosis [E87.2]  Sinus tachycardia [R00.0]  Thrombocytopenia ([address] [D69.6]  Alcohol abuse [F10.10]    Principal Discharge Diagnoses:    Alcohol withdrawal    OTHER PROBLEMS ADDRESSEDS  Principal Diagnosis <principal problem not specified>                                            Active Problems:    Sinus tachycardia (3/29/2021)      Hypokalemia (3/29/2021)      Lactic acidosis (3/29/2021)      Alcohol abuse (3/29/2021)      Hyponatremia (3/29/2021)      Thrombocytopenia ([address] (3/29/2021)      Steatosis of liver (3/29/2021)      Generalized weakness (3/29/2021)      Elevated liver transaminase level (3/29/2021)      Alcohol withdrawal ([address] (3/30/2021)      Unintentional weight loss (3/30/2021)      Protein calorie malnutrition ([address] (3/30/2021)       Patient Active Problem List   Diagnosis Code    Sinus tachycardia R00.0    Hypokalemia E87.6    Lactic acidosis E87.2    Alcohol abuse F10.10    Hyponatremia E87.1    Thrombocytopenia (HCC) D69.6    Steatosis of liver K76.0    Generalized weakness R53.1    Elevated liver transaminase level R74.01    Alcohol withdrawal ([address] F10.239    Unintentional weight loss R63.4    Protein calorie malnutrition [facility]) [facility] Course:   Ms. Tray Cuellar is a 46 yo F w/ hx of HTN, ETOH abuse presenting with fatigue, abnormal liver enzymes from urgent care, admitted for alcohol withdrawal. Hospital course complicated by problems as listed below:     Alcohol withdrawal/ tachycardia: much improved. Weaned off PO Ativan. Low CIWA. Close to one week out now from last drink. Stable for DC.  Continue folate and thiamine    Tachycardia: likely from ETOH w/d, debility. D-dimer negative. Much improved. Echo unremarkable .     Acute hepatitis: Likely due to alcohol. Viral hepatitis panel unremarkable. More serologies pending per GI. Borderline low ceruloplasmin is not 2/2 Russell disease but rather more like alcoholism. US showed hepatic steatosis and mildly dilated common bile duct, with no visualized choledocholithiasis. Evaluated by GI. Follow up outpatient     Hypothyroidism: subclinical hypothyroidism. TSH high but <10 and FT4 low/normal. Low-dose levothyroxine started.  Outpatient follow-up, repeat TSH / FT4 in 2 weeks     Hypokalemia/hyponatremia: improved. Likely due to alcohol abuse. Follow BMP     Weakness: Treat hypothyroidism. Correct electrolyte deficiencies.  PT/OT outpatient     Thrombocytopenia: mild, likely due to alcohol abuse. Resolved     Macrocytic anemia: borderline at best. Likely 2/2 ETOH. B12 and folate WNR. Follow Hg     Unintentional weight loss and possible protein calorie malnutrition due to poor oral intake:  suspect due to alcohol abuse.  Nutrition following.  GI evaluted; f/u OP. Recommend age-appropriate CA screening outpatient including colonoscopy, mammogram, cervical CA screening, low res CT given smoking hx, etc     Tobacco abuse: Recommended smoking cessation     Pt discharged in improved and stable condition. Procedures performed: see above    Imaging studies: see above  Cta Chest W Or W Wo Cont    Result Date: 3/29/2021  No evidence of acute pulmonary embolus or other acute cardiopulmonary process. Diffuse hepatic steatosis. [facility]    Result Date: 3/29/2021  Mildly increased hepatic echogenicity suggests hepatic parenchymal disease, possibly hepatic steatosis. Mildly dilated common bile duct, with no visualized choledocholithiasis. PCP: Jordin Ghotra MD    Consults: GI    Discharge Exam:  GEN: NAD  CV: RRR  RESP: CTAB  ABD: NTTP  NEURO: A&O x4. No tremors.  No anxiousness    Disposition: home    Patient Instructions:   Current Discharge Medication List      START taking these medications    Details   folic acid (FOLVITE) 1 mg tablet Take 1 Tab by mouth daily. Qty: 30 Tab, Refills: 0      levothyroxine (SYNTHROID) 25 mcg tablet Take 1 Tab by mouth Daily (before breakfast). Qty: 30 Tab, Refills: 0      therapeutic multivitamin (THERAGRAN) tablet Take 1 Tab by mouth daily. Qty: 30 Tab, Refills: 0      thiamine mononitrate (B-1) 100 mg tablet Take 1 Tab by mouth daily. Qty: 30 Tab, Refills: 0      OTHER,NON-FORMULARY, Physical therapy evaluate and treat  Qty: 1 Each, Refills: 0         STOP taking these medications       lisinopriL (PRINIVIL, ZESTRIL) 5 mg tablet Comments:   Reason for Stopping:               Activity: See discharge instructions  Diet: See discharge instructions  Wound Care: See discharge instructions    Follow-up Information     Follow up With Specialties Details Why Contact Info    [facility]   [place]patient [place] [address] [address]  [phone]    Enzo Juarez MD Family Medicine In 3 days  [address]  [phone]            I spent 35 minutes on this discharge.     Signed:  Amelia Carvajal MD  4/2/2021  1:04 PM    CC: PCP Dr. Tom Delgado

## 2021-04-02 NOTE — PROGRESS NOTES
CM follow up:    Patient has order for discharge today. Met with patient at bedside, face mask and goggles on. Patient states her  will transport her home today. Discussed outpatient therapy, verbalized understanding. Follow up PCP appointment 4/6/21 at 10:20am.     Care Management Interventions  PCP Verified by CM: Yes(Myrtle Manrique MD; last visit 30 days)  Palliative Care Criteria Met (RRAT>21 & CHF Dx)?: No  Mode of Transport at Discharge:  Other (see comment)(per )  Transition of Care Consult (CM Consult): [address]: Yes  Physical Therapy Consult: Yes  Occupational Therapy Consult: No  Current Support Network: Lives with Spouse, Own Home(pt lives w/  in pvt residence, at baseline pt is ambulatory, iADLs, drives, family can assist)  Confirm Follow Up Transport: Family  The Plan for Transition of Care is Related to the Following Treatment Goals : Home Health  The Patient and/or Patient Representative was Provided with a Choice of Provider and Agrees with the Discharge Plan?: Yes  Name of the Patient Representative Who was Provided with a Choice of Provider and Agrees with the Discharge Plan: patient  Freedom of Choice List was Provided with Basic Dialogue that Supports the Patient's Individualized Plan of Care/Goals, Treatment Preferences and Shares the Quality Data Associated with the Providers?: (S) Yes  Discharge Location  Discharge Placement: Home with family assistance    Trang Sorenson RN, MSN/Care manager  [phone]

## 2021-04-02 NOTE — PROGRESS NOTES
Comprehensive Nutrition Assessment    Type and Reason for Visit: Reassess    Nutrition Recommendations/Plan:   1. Continue regular diet. 2. Continue Ensure HP 1x/day to promote intake and weight maintenance. Nutrition Assessment:      4/2: Follow up. Pt reports good appetite. Breakfast tray in room, 25% consumed, but pt says she is never a big breakfast person. Recorded intakes good, mostly % meals. Likes Ensure HP- will continue. No c/o N/V. Labs and meds reviewed. 3/30: 46 y/o female admitted with elevated liver transaminase. PMH includes HTN, alcohol abuse. RD consult received for \"unintentional weight loss. \" Pt confirms 20# wt loss x2 years. She attributes this weight loss to decreased appetite. Says she still tries to eat 3 meals/day + snacks. She does not follow a particular diet or avoid any certain foods. No c/o n/v/abd pain. No diet order currently in, will monitor for diet once available. Pt agreeable to trying Ensure HP once diet advanced to help maintain weight. She has no nutrition-related questions at this time. Labs- K 3.4. Meds- folic acid, MVI, thiamin. Malnutrition Assessment:  Malnutrition Status: none identified    Estimated Daily Nutrient Needs:  Energy (kcal): 5633(3646 x 1.3 AF); Weight Used for Energy Requirements: Current  Protein (g): 54(1-1.2 g/kg); Weight Used for Protein Requirements: Current  Fluid (ml/day): 1528; Method Used for Fluid Requirements: 1 ml/kcal      Nutrition Related Findings:  last BM 4/1      Wounds:    None       Current Nutrition Therapies:  DIET NUTRITIONAL SUPPLEMENTS Dinner; Ensure High Protein  DIET REGULAR    Anthropometric Measures:  · Height:  5' 6\" (167.6 cm)  · Current Body Wt:  53.3 kg (117 lb 8.1 oz)   · Admission Body Wt:       · Usual Body Wt:        · Ideal Body Wt:  130 lbs:  92.3 %   · Adjusted Body Weight:   ; Weight Adjustment for: No adjustment   · Adjusted BMI:       · BMI Category:  Normal weight (BMI 18.5-24. 9)       Nutrition Diagnosis:   · Unintended weight loss related to inadequate protein-energy intake as evidenced by weight loss(20# x2 years)    4/2: Nutrition dx continues.     Nutrition Interventions:   Food and/or Nutrient Delivery: Continue current diet, Continue oral nutrition supplement  Nutrition Education and Counseling: No recommendations at this time  Coordination of Nutrition Care: Continue to monitor while inpatient    Goals:  PO intake >50% meals + ONS with weight maintenance next 3-5 days       Nutrition Monitoring and Evaluation:   Behavioral-Environmental Outcomes: None identified  Food/Nutrient Intake Outcomes: Food and nutrient intake, Supplement intake  Physical Signs/Symptoms Outcomes: Weight, Biochemical data    Discharge Planning:    Continue current diet, Continue oral nutrition supplement     Electronically signed by Jt Carr RDN on 4/2/2021 at 11:04 AM    Contact: [phone]

## 2021-04-02 NOTE — PROGRESS NOTES
Bedside shift change report given to Joel Beatty RN (oncoming nurse) by An Sosa RN (offgoing nurse). Report included the following information SBAR, Kardex, ED Summary, MAR, Recent Results and Cardiac Rhythm NSR. This patient was assisted with Intentional Toileting every 2 hours during this shift. Documentation of ambulation and output reflected on Flowsheet.

## 2021-04-03 LAB
BACTERIA SPEC CULT: NORMAL
MITOCHONDRIA M2 IGG SER-ACNC: <20 UNITS (ref 0–20)
SERVICE CMNT-IMP: NORMAL

## 2021-04-09 LAB — VIT B1 BLD-SCNC: 144.8 NMOL/L (ref 66.5–200)

## 2022-03-18 PROBLEM — R00.0 SINUS TACHYCARDIA: Status: ACTIVE | Noted: 2021-03-29

## 2022-03-18 PROBLEM — E46 PROTEIN CALORIE MALNUTRITION (HCC): Status: ACTIVE | Noted: 2021-03-30

## 2022-03-18 PROBLEM — E87.1 HYPONATREMIA: Status: ACTIVE | Noted: 2021-03-29

## 2022-03-18 PROBLEM — D69.6 THROMBOCYTOPENIA (HCC): Status: ACTIVE | Noted: 2021-03-29

## 2022-03-18 PROBLEM — F10.939 ALCOHOL WITHDRAWAL (HCC): Status: ACTIVE | Noted: 2021-03-30

## 2022-03-19 PROBLEM — R74.01 ELEVATED LIVER TRANSAMINASE LEVEL: Status: ACTIVE | Noted: 2021-03-29

## 2022-03-19 PROBLEM — R53.1 GENERALIZED WEAKNESS: Status: ACTIVE | Noted: 2021-03-29

## 2022-03-19 PROBLEM — K76.0 STEATOSIS OF LIVER: Status: ACTIVE | Noted: 2021-03-29

## 2022-03-19 PROBLEM — E87.6 HYPOKALEMIA: Status: ACTIVE | Noted: 2021-03-29

## 2022-03-19 PROBLEM — E87.20 LACTIC ACIDOSIS: Status: ACTIVE | Noted: 2021-03-29

## 2022-03-19 PROBLEM — R63.4 UNINTENTIONAL WEIGHT LOSS: Status: ACTIVE | Noted: 2021-03-30

## 2022-03-20 PROBLEM — F10.10 ALCOHOL ABUSE: Status: ACTIVE | Noted: 2021-03-29

## 2023-05-14 RX ORDER — FOLIC ACID 1 MG/1
1 TABLET ORAL DAILY
COMMUNITY
Start: 2021-04-03

## 2023-05-14 RX ORDER — LANOLIN ALCOHOL/MO/W.PET/CERES
100 CREAM (GRAM) TOPICAL DAILY
COMMUNITY
Start: 2021-04-03

## 2023-05-14 RX ORDER — LEVOTHYROXINE SODIUM 0.03 MG/1
25 TABLET ORAL
COMMUNITY
Start: 2021-04-03